# Patient Record
Sex: FEMALE | Race: BLACK OR AFRICAN AMERICAN | NOT HISPANIC OR LATINO | Employment: FULL TIME | URBAN - METROPOLITAN AREA
[De-identification: names, ages, dates, MRNs, and addresses within clinical notes are randomized per-mention and may not be internally consistent; named-entity substitution may affect disease eponyms.]

---

## 2018-08-07 ENCOUNTER — HOSPITAL ENCOUNTER (EMERGENCY)
Facility: CLINIC | Age: 28
Discharge: HOME | End: 2018-08-07
Attending: EMERGENCY MEDICINE | Admitting: EMERGENCY MEDICINE
Payer: MEDICAID

## 2018-08-07 ENCOUNTER — APPOINTMENT (OUTPATIENT)
Dept: CT IMAGING | Facility: CLINIC | Age: 28
End: 2018-08-07
Attending: EMERGENCY MEDICINE
Payer: MEDICAID

## 2018-08-07 VITALS
HEART RATE: 92 BPM | HEIGHT: 66 IN | WEIGHT: 183 LBS | RESPIRATION RATE: 18 BRPM | SYSTOLIC BLOOD PRESSURE: 130 MMHG | OXYGEN SATURATION: 100 % | BODY MASS INDEX: 29.41 KG/M2 | TEMPERATURE: 99.1 F | DIASTOLIC BLOOD PRESSURE: 80 MMHG

## 2018-08-07 DIAGNOSIS — R52 BODY ACHES: ICD-10-CM

## 2018-08-07 DIAGNOSIS — R07.81 PLEURITIC CHEST PAIN: ICD-10-CM

## 2018-08-07 DIAGNOSIS — G47.00 INSOMNIA, UNSPECIFIED TYPE: ICD-10-CM

## 2018-08-07 LAB
ANION GAP SERPL CALCULATED.3IONS-SCNC: 7 MMOL/L (ref 3–14)
ANION GAP SERPL CALCULATED.3IONS-SCNC: NORMAL MMOL/L (ref 6–17)
BASOPHILS # BLD AUTO: 0 10E9/L (ref 0–0.2)
BASOPHILS NFR BLD AUTO: 0.3 %
BUN SERPL-MCNC: 8 MG/DL (ref 7–30)
BUN SERPL-MCNC: NORMAL MG/DL (ref 7–30)
CALCIUM SERPL-MCNC: 7.9 MG/DL (ref 8.5–10.1)
CALCIUM SERPL-MCNC: NORMAL MG/DL (ref 8.5–10.1)
CHLORIDE SERPL-SCNC: 103 MMOL/L (ref 94–109)
CHLORIDE SERPL-SCNC: NORMAL MMOL/L (ref 94–109)
CO2 SERPL-SCNC: 27 MMOL/L (ref 20–32)
CO2 SERPL-SCNC: NORMAL MMOL/L (ref 20–32)
CREAT SERPL-MCNC: 0.92 MG/DL (ref 0.52–1.04)
CREAT SERPL-MCNC: NORMAL MG/DL (ref 0.52–1.04)
D DIMER PPP FEU-MCNC: 1.7 UG/ML FEU (ref 0–0.5)
DIFFERENTIAL METHOD BLD: ABNORMAL
EOSINOPHIL # BLD AUTO: 0.1 10E9/L (ref 0–0.7)
EOSINOPHIL NFR BLD AUTO: 3.2 %
ERYTHROCYTE [DISTWIDTH] IN BLOOD BY AUTOMATED COUNT: 12.1 % (ref 10–15)
GFR SERPL CREATININE-BSD FRML MDRD: 73 ML/MIN/1.7M2
GFR SERPL CREATININE-BSD FRML MDRD: NORMAL ML/MIN/1.7M2
GLUCOSE SERPL-MCNC: 89 MG/DL (ref 70–99)
GLUCOSE SERPL-MCNC: NORMAL MG/DL (ref 70–99)
HCG SERPL QL: NEGATIVE
HCT VFR BLD AUTO: 38 % (ref 35–47)
HETEROPH AB SER QL: NEGATIVE
HGB BLD-MCNC: 12.9 G/DL (ref 11.7–15.7)
IMM GRANULOCYTES # BLD: 0 10E9/L (ref 0–0.4)
IMM GRANULOCYTES NFR BLD: 0.3 %
INTERPRETATION ECG - MUSE: NORMAL
LYMPHOCYTES # BLD AUTO: 1.1 10E9/L (ref 0.8–5.3)
LYMPHOCYTES NFR BLD AUTO: 30.7 %
MCH RBC QN AUTO: 30.7 PG (ref 26.5–33)
MCHC RBC AUTO-ENTMCNC: 33.9 G/DL (ref 31.5–36.5)
MCV RBC AUTO: 91 FL (ref 78–100)
MONOCYTES # BLD AUTO: 0.3 10E9/L (ref 0–1.3)
MONOCYTES NFR BLD AUTO: 8.7 %
NEUTROPHILS # BLD AUTO: 2 10E9/L (ref 1.6–8.3)
NEUTROPHILS NFR BLD AUTO: 56.8 %
PLATELET # BLD AUTO: 135 10E9/L (ref 150–450)
POTASSIUM SERPL-SCNC: 3.2 MMOL/L (ref 3.4–5.3)
POTASSIUM SERPL-SCNC: NORMAL MMOL/L (ref 3.4–5.3)
RBC # BLD AUTO: 4.2 10E12/L (ref 3.8–5.2)
SODIUM SERPL-SCNC: 137 MMOL/L (ref 133–144)
SODIUM SERPL-SCNC: NORMAL MMOL/L (ref 133–144)
TROPONIN I SERPL-MCNC: <0.015 UG/L (ref 0–0.04)
TROPONIN I SERPL-MCNC: NORMAL UG/L (ref 0–0.04)
TSH SERPL DL<=0.005 MIU/L-ACNC: 1.78 MU/L (ref 0.4–4)
TSH SERPL DL<=0.005 MIU/L-ACNC: NORMAL MU/L (ref 0.4–4)
WBC # BLD AUTO: 3.5 10E9/L (ref 4–11)

## 2018-08-07 PROCEDURE — 99285 EMERGENCY DEPT VISIT HI MDM: CPT | Mod: 25

## 2018-08-07 PROCEDURE — 85379 FIBRIN DEGRADATION QUANT: CPT | Performed by: EMERGENCY MEDICINE

## 2018-08-07 PROCEDURE — 84443 ASSAY THYROID STIM HORMONE: CPT | Performed by: EMERGENCY MEDICINE

## 2018-08-07 PROCEDURE — 25000125 ZZHC RX 250: Performed by: EMERGENCY MEDICINE

## 2018-08-07 PROCEDURE — 84703 CHORIONIC GONADOTROPIN ASSAY: CPT | Performed by: EMERGENCY MEDICINE

## 2018-08-07 PROCEDURE — 80048 BASIC METABOLIC PNL TOTAL CA: CPT | Performed by: EMERGENCY MEDICINE

## 2018-08-07 PROCEDURE — 84484 ASSAY OF TROPONIN QUANT: CPT | Performed by: EMERGENCY MEDICINE

## 2018-08-07 PROCEDURE — 86308 HETEROPHILE ANTIBODY SCREEN: CPT | Performed by: EMERGENCY MEDICINE

## 2018-08-07 PROCEDURE — 71260 CT THORAX DX C+: CPT

## 2018-08-07 PROCEDURE — 93005 ELECTROCARDIOGRAM TRACING: CPT

## 2018-08-07 PROCEDURE — 85025 COMPLETE CBC W/AUTO DIFF WBC: CPT | Performed by: EMERGENCY MEDICINE

## 2018-08-07 PROCEDURE — 25000128 H RX IP 250 OP 636: Performed by: EMERGENCY MEDICINE

## 2018-08-07 RX ORDER — TRAZODONE HYDROCHLORIDE 50 MG/1
50 TABLET, FILM COATED ORAL
Qty: 7 TABLET | Refills: 0 | Status: SHIPPED | OUTPATIENT
Start: 2018-08-07 | End: 2020-06-29

## 2018-08-07 RX ORDER — IOPAMIDOL 755 MG/ML
69 INJECTION, SOLUTION INTRAVASCULAR ONCE
Status: COMPLETED | OUTPATIENT
Start: 2018-08-07 | End: 2018-08-07

## 2018-08-07 RX ADMIN — IOPAMIDOL 69 ML: 755 INJECTION, SOLUTION INTRAVENOUS at 03:34

## 2018-08-07 RX ADMIN — SODIUM CHLORIDE 93 ML: 900 INJECTION INTRAVENOUS at 03:35

## 2018-08-07 ASSESSMENT — ENCOUNTER SYMPTOMS
CHILLS: 1
DIARRHEA: 0
CONSTIPATION: 1
MYALGIAS: 1
FEVER: 1
NAUSEA: 0
FATIGUE: 1
EYE ITCHING: 0
PALPITATIONS: 1
SORE THROAT: 0
RHINORRHEA: 0
SHORTNESS OF BREATH: 0
VOMITING: 0

## 2018-08-07 NOTE — ED AVS SNAPSHOT
Emergency Department    [address] [place] [address]    Phone:  [phone]    Fax:  [phone]                                       Zehra Brunner   MRN: [id number]    Department:   Emergency Department   Date of Visit:  8/7/2018           After Visit Summary Signature Page     I have received my discharge instructions, and my questions have been answered. I have discussed any challenges I see with this plan with the nurse or doctor.    ..........................................................................................................................................  Patient/Patient Representative Signature      ..........................................................................................................................................  Patient Representative Print Name and Relationship to Patient    ..................................................               ................................................  Date                                            Time    ..........................................................................................................................................  Reviewed by Signature/Title    ...................................................              ..............................................  Date                                                            Time

## 2018-08-07 NOTE — DISCHARGE INSTRUCTIONS
Continue to use naproxen as needed for pain.    For sleep, the following are available over the counter:  - Melatonin: use a minimum of 3mg at least 30 minutes before trying to fall asleep  - Benadryl (diphenhydramine): 50mg (2 tablets) as needed for sleep    Discharge Instructions  Chest Pain    You have been seen today for chest pain or discomfort.  At this time, your provider has found no signs that your chest pain is due to a serious or life-threatening condition, (or you have declined more testing and/or admission to the hospital). However, sometimes there is a serious problem that does not show up right away. Your evaluation today may not be complete and you may need further testing and evaluation.     Generally, every Emergency Department visit should have a follow-up clinic visit with either a primary or a specialty clinic/provider. Please follow-up as instructed by your emergency provider today.  Return to the Emergency Department if:    Your chest pain changes, gets worse, starts to happen more often, or comes with less activity.    You are newly short of breath.    You get very weak or tired.    You pass out or faint.    You have any new symptoms, like fever, cough, numb legs, or you cough up blood.    You have anything else that worries you.    Until you follow-up with your regular provider, please do the following:    Take one aspirin daily unless you have an allergy or are told not to by your provider.    If a stress test appointment has been made, go to the appointment.    If you have questions, contact your regular provider.    Follow-up with your regular provider/clinic as directed; this is very important.    If you were given a prescription for medicine here today, be sure to read all of the information (including the package insert) that comes with your prescription.  This will include important information about the medicine, its side effects, and any warnings that you need to know about.  The  pharmacist who fills the prescription can provide more information and answer questions you may have about the medicine.  If you have questions or concerns that the pharmacist cannot address, please call or return to the Emergency Department.       Remember that you can always come back to the Emergency Department if you are not able to see your regular provider in the amount of time listed above, if you get any new symptoms, or if there is anything that worries you.

## 2018-08-07 NOTE — ED PROVIDER NOTES
History     Chief Complaint:  Chest Pain and myalgias    HPI   Zehra Brunner is a 27 year old female who presents to the emergency department today for evaluation of chest pain and generalized body aches. The patient reports she has been experiencing myalgias with associated fatigue, congestion, headache, and subjective fevers and chills for the past four days. She notes that she has been having difficulty breathing deeply with pain in her left side. She has been taking muscle relaxers with partial relief (later states it was naproxen). Last night, she notes heart was beating heavily with difficulty sleeping.  Trouble sleeping has been present a few days.  Additionally, she has been experiencing three week of constipations. She was concerned about these symptoms prompting her visit to the ED. She denies shortness of breath, rhinorrhea, sore throat, new eye itching, sneezing, vomiting, diarrhea, and nausea. Of note, the patient did present with her son who has being seen in the ED as well. She is unsure about the chance of pregnancy, but is on injection birth control.    Allergies:  No Known Drug Allergies    Medications:    Lamictal    Past Medical History:    Depression    Past Surgical History:    History reviewed. No pertinent past surgical history.    Family History:    History reviewed. No pertinent family history.     Social History:  The patient was accompanied to the ED by children.  Smoking Status: Current Every Day Smoker  Smokeless Tobacco: Never  Alcohol Use: Yes  Marital Status:  Single     Review of Systems   Constitutional: Positive for chills, fatigue and fever.   HENT: Positive for congestion. Negative for rhinorrhea, sneezing and sore throat.    Eyes: Negative for itching.   Respiratory: Negative for shortness of breath.    Cardiovascular: Positive for chest pain and palpitations.   Gastrointestinal: Positive for constipation. Negative for diarrhea, nausea and vomiting.   Musculoskeletal:  "Positive for myalgias.   All other systems reviewed and are negative.    Physical Exam     Patient Vitals for the past 24 hrs:   BP Temp Temp src Pulse Resp SpO2 Height Weight   08/07/18 0426 130/80 - - - - - - -   08/07/18 0110 133/84 99.1  F (37.3  C) Oral 92 18 100 % 1.676 m (5' 6\") 83 kg (183 lb)         Physical Exam  Eyes:  Sclera white; Pupils are equal and round  ENT:    External ears and nares normal, prominent thyroid region of neck but symmetric and non-tender    Oropharynx normal  CV:  Rate as above with regular rhythm     Symmetric radial pulses    No BLE edema  Resp:  Breath sounds clear and equal bilaterally  GI:  Abdomen is soft, non-tender  MS:  Moves all extremities  Skin:  Warm and dry  Neuro:  Speech is normal and fluent. Alert.          Emergency Department Course   ECG:  Indication: chest pain  Completed at 0102.  Read at 0109.   Normal sinus rhythm  Normal ECG   Rate 91 bpm. IN interval 144. QRS duration 84. QT/QTc 368/452. P-R-T axes 70  38  33.    Imaging:  Radiology findings were communicated with the patient who voiced understanding of the findings.  CT Chest Pulmonary Embolism w Contrast  IMPRESSION: There is no pulmonary embolus, aortic aneurysm or  dissection. No acute abnormality.  Report per radiology     Laboratory:  Laboratory findings were communicated with the patient who voiced understanding of the findings.  CBC: WBC 3.5 (L),  (L) o/w WNL. (HGB 12.9)   BMP: potassium 3.2 (L) o/w WNL (Creatinine 0.92)  Troponin (Collected 0134): <0.015  TSH with free T4 reflex: 1.78  D Dimer (Collected 0111): 1.7 (H)  Mononucleosis: Negative  HCG Qualitative Blood: Negative    Emergency Department Course:  Nursing notes and vitals reviewed.  IV was inserted and blood was drawn for laboratory testing, results above.  The patient was sent for a CT Chest Pulmonary Embolism w Contrast while in the emergency department, results above.   0050: I performed an exam of the patient as documented " above.   0423: Patient rechecked and updated.   Findings and plan explained to the Patient. Patient discharged home with instructions regarding supportive care, medications, and reasons to return. The importance of close follow-up was reviewed. The patient was prescribed Trazodone.  I personally reviewed the laboratory, imaging, and EKG results with the Patient and answered all related questions prior to discharge.    Impression & Plan    Medical Decision Making:  Zehra Brunner is a 27 year old female who is here for evaluation of pleuritic chest pain, fatigue, and body aches. Work up is not consistent with mono as an etiology of her symptoms. She is on birth control and d-dimer is elevated concerning for PE. CT was obtained showing no acute etiology for her symptoms. EKG showed no dysrhythmia or ischemia. Blood work was negative for anemia, significant electrolyte abnormalities, thyroid abnormalities, or cardiac injury. At this point, I recommended the use of NSAIDs and over the counter sleep agents. If these do not help, then a short prescription for trazodone was prescribed.     Diagnosis:    ICD-10-CM    1. Pleuritic chest pain R07.81    2. Body aches R52    3. Insomnia, unspecified type G47.00        Disposition:  discharged to home    Discharge Medications:  New Prescriptions    TRAZODONE (DESYREL) 50 MG TABLET    Take 1 tablet (50 mg) by mouth nightly as needed for sleep         Scribe Disclosure:  Perry MCCLURE, am serving as a scribe at 1:02 AM on 8/7/2018 to document services personally performed by Stacy Pena MD based on my observations and the provider's statements to me.     8/7/2018    EMERGENCY DEPARTMENT       Stacy Pena MD  08/07/18 0646

## 2018-08-07 NOTE — ED AVS SNAPSHOT
Emergency Department    [address] [place] [address]    Phone:  [phone]    Fax:  [phone]                                       Zehra Brunner   MRN: [id number]    Department:   Emergency Department   Date of Visit:  8/7/2018           Patient Information     Date Of Birth          1990        Your diagnoses for this visit were:     Pleuritic chest pain     Body aches     Insomnia, unspecified type        You were seen by Stacy Pena MD.      Follow-up Information     Follow up with In clinic.    Why:  As needed        Discharge Instructions       Continue to use naproxen as needed for pain.    For sleep, the following are available over the counter:  - Melatonin: use a minimum of 3mg at least 30 minutes before trying to fall asleep  - Benadryl (diphenhydramine): 50mg (2 tablets) as needed for sleep    Discharge Instructions  Chest Pain    You have been seen today for chest pain or discomfort.  At this time, your provider has found no signs that your chest pain is due to a serious or life-threatening condition, (or you have declined more testing and/or admission to the hospital). However, sometimes there is a serious problem that does not show up right away. Your evaluation today may not be complete and you may need further testing and evaluation.     Generally, every Emergency Department visit should have a follow-up clinic visit with either a primary or a specialty clinic/provider. Please follow-up as instructed by your emergency provider today.  Return to the Emergency Department if:    Your chest pain changes, gets worse, starts to happen more often, or comes with less activity.    You are newly short of breath.    You get very weak or tired.    You pass out or faint.    You have any new symptoms, like fever, cough, numb legs, or you cough up blood.    You have anything else that worries you.    Until you follow-up with your regular provider, please do the  following:    Take one aspirin daily unless you have an allergy or are told not to by your provider.    If a stress test appointment has been made, go to the appointment.    If you have questions, contact your regular provider.    Follow-up with your regular provider/clinic as directed; this is very important.    If you were given a prescription for medicine here today, be sure to read all of the information (including the package insert) that comes with your prescription.  This will include important information about the medicine, its side effects, and any warnings that you need to know about.  The pharmacist who fills the prescription can provide more information and answer questions you may have about the medicine.  If you have questions or concerns that the pharmacist cannot address, please call or return to the Emergency Department.       Remember that you can always come back to the Emergency Department if you are not able to see your regular provider in the amount of time listed above, if you get any new symptoms, or if there is anything that worries you.      24 Hour Appointment Hotline       To make an appointment at any [facility], call [phone] ([phone]). If you don't have a family doctor or clinic, we will help you find one. [facility] clinics are conveniently located to serve the needs of you and your family.             Review of your medicines      START taking        Dose / Directions Last dose taken    traZODone 50 MG tablet   Commonly known as:  DESYREL   Dose:  50 mg   Quantity:  7 tablet        Take 1 tablet (50 mg) by mouth nightly as needed for sleep   Refills:  0          Our records show that you are taking the medicines listed below. If these are incorrect, please call your family doctor or clinic.        Dose / Directions Last dose taken    metroNIDAZOLE 500 MG tablet   Commonly known as:  FLAGYL   Dose:  500 mg   Quantity:  21 tablet        Take 1 tablet by mouth 3 times  daily.   Refills:  0        NO ACTIVE MEDICATIONS        Refills:  0                Prescriptions were sent or printed at these locations (1 Prescription)                   Other Prescriptions                Printed at Department/Unit printer (1 of 1)         traZODone (DESYREL) 50 MG tablet                Procedures and tests performed during your visit     Procedure/Test Number of Times Performed    Basic metabolic panel 2    CBC with platelets differential 1    CT Chest Pulmonary Embolism w Contrast 1    D dimer quantitative 1    EKG 12-lead, tracing only 1    HCG QUALitative pregnancy (blood) 1    Mononucleosis screen 1    Peripheral IV catheter 1    TSH with free T4 reflex 2    Troponin I 2      Orders Needing Specimen Collection     None      Pending Results     Date and Time Order Name Status Description    8/7/2018 0226 CT Chest Pulmonary Embolism w Contrast Preliminary             Pending Culture Results     No orders found from 8/5/2018 to 8/8/2018.            Pending Results Instructions     If you had any lab results that were not finalized at the time of your Discharge, you can call the ED Lab Result RN at [phone]. You will be contacted by this team for any positive Lab results or changes in treatment. The nurses are available 7 days a week from 10A to 6:30P.  You can leave a message 24 hours per day and they will return your call.        Test Results From Your Hospital Stay        8/7/2018  1:24 AM      Component Results     Component Value Ref Range & Units Status    WBC 3.5 (L) 4.0 - 11.0 10e9/L Final    RBC Count 4.20 3.8 - 5.2 10e12/L Final    Hemoglobin 12.9 11.7 - 15.7 g/dL Final    Hematocrit 38.0 35.0 - 47.0 % Final    MCV 91 78 - 100 fl Final    MCH 30.7 26.5 - 33.0 pg Final    MCHC 33.9 31.5 - 36.5 g/dL Final    RDW 12.1 10.0 - 15.0 % Final    Platelet Count 135 (L) 150 - 450 10e9/L Final    Diff Method Automated Method  Final    % Neutrophils 56.8 % Final    % Lymphocytes 30.7 % Final     % Monocytes 8.7 % Final    % Eosinophils 3.2 % Final    % Basophils 0.3 % Final    % Immature Granulocytes 0.3 % Final    Absolute Neutrophil 2.0 1.6 - 8.3 10e9/L Final    Absolute Lymphocytes 1.1 0.8 - 5.3 10e9/L Final    Absolute Monocytes 0.3 0.0 - 1.3 10e9/L Final    Absolute Eosinophils 0.1 0.0 - 0.7 10e9/L Final    Absolute Basophils 0.0 0.0 - 0.2 10e9/L Final    Abs Immature Granulocytes 0.0 0 - 0.4 10e9/L Final         8/7/2018  1:28 AM      Component Results     Component Value Ref Range & Units Status    Sodium Canceled, Test credited 133 - 144 mmol/L Final    Unsatisfactory specimen - hemolyzed    Potassium Canceled, Test credited 3.4 - 5.3 mmol/L Final    Unsatisfactory specimen - hemolyzed    Chloride Canceled, Test credited 94 - 109 mmol/L Final    Unsatisfactory specimen - hemolyzed    Carbon Dioxide Canceled, Test credited 20 - 32 mmol/L Final    Unsatisfactory specimen - hemolyzed    Anion Gap Canceled, Test credited 6 - 17 mmol/L Final    Unsatisfactory specimen - hemolyzed    Glucose Canceled, Test credited 70 - 99 mg/dL Final    Unsatisfactory specimen - hemolyzed    Urea Nitrogen Canceled, Test credited 7 - 30 mg/dL Final    Unsatisfactory specimen - hemolyzed    Creatinine Canceled, Test credited 0.52 - 1.04 mg/dL Final    Unsatisfactory specimen - hemolyzed    GFR Estimate Canceled, Test credited >60 mL/min/1.7m2 Final    Unsatisfactory specimen - hemolyzed    GFR Estimate If Black Canceled, Test credited >60 mL/min/1.7m2 Final    Unsatisfactory specimen - hemolyzed    Calcium Canceled, Test credited 8.5 - 10.1 mg/dL Final    Unsatisfactory specimen - hemolyzed         8/7/2018  1:28 AM      Component Results     Component Value Ref Range & Units Status    TSH Canceled, Test credited 0.40 - 4.00 mU/L Final    Unsatisfactory specimen - hemolyzed         8/7/2018  1:28 AM      Component Results     Component Value Ref Range & Units Status    Troponin I ES Canceled, Test credited 0.000 - 0.045  ug/L Final    Unsatisfactory specimen - hemolyzed         8/7/2018  1:50 AM      Component Results     Component Value Ref Range & Units Status    D Dimer 1.7 (H) 0.0 - 0.50 ug/ml FEU Final    This D-dimer assay is intended for use in conjunction with a clinical pretest   probability assessment model to exclude pulmonary embolism (PE) and deep   venous thrombosis (DVT) in outpatients suspected of PE or DVT. The cut-off   value is 0.5 ug/mL FEU.           8/7/2018  1:40 AM      Component Results     Component Value Ref Range & Units Status    Mononucleosis Screen Negative NEG^Negative Final         8/7/2018  2:01 AM      Component Results     Component Value Ref Range & Units Status    Sodium 137 133 - 144 mmol/L Final    Potassium 3.2 (L) 3.4 - 5.3 mmol/L Final    Chloride 103 94 - 109 mmol/L Final    Carbon Dioxide 27 20 - 32 mmol/L Final    Anion Gap 7 3 - 14 mmol/L Final    Glucose 89 70 - 99 mg/dL Final    Urea Nitrogen 8 7 - 30 mg/dL Final    Creatinine 0.92 0.52 - 1.04 mg/dL Final    GFR Estimate 73 >60 mL/min/1.7m2 Final    Non  GFR Calc    GFR Estimate If Black 88 >60 mL/min/1.7m2 Final    African American GFR Calc    Calcium 7.9 (L) 8.5 - 10.1 mg/dL Final         8/7/2018  2:01 AM      Component Results     Component Value Ref Range & Units Status    Troponin I ES <0.015 0.000 - 0.045 ug/L Final    The 99th percentile for upper reference range is 0.045 ug/L.  Troponin values   in the range of 0.045 - 0.120 ug/L may be associated with risks of adverse   clinical events.           8/7/2018  2:06 AM      Component Results     Component Value Ref Range & Units Status    TSH 1.78 0.40 - 4.00 mU/L Final         8/7/2018  3:48 AM      Narrative     CT CHEST PULMONARY EMBOLISM W CONTRAST  8/7/2018 3:38 AM    HISTORY: Dyspnea.     TECHNIQUE: Scans obtained from the apices through the diaphragm with  IV contrast. 69 mL Isovue-370 injected. Radiation dose for this scan  was reduced using automated  exposure control, adjustment of the mA  and/or kV according to patient size, or iterative reconstruction  technique.    COMPARISON: None.    FINDINGS: Evaluation of the pulmonary arterial system shows no  evidence of embolus. There is no aortic aneurysm or dissection. The  heart size is normal. There is no mediastinal, hilar or axillary lymph  node enlargement. A few borderline-enlarged lymph nodes in the axillae  bilaterally. The lungs are clear. No pneumothorax or pleural effusion.  Images through the upper abdomen show no acute abnormalities.        Impression     IMPRESSION: There is no pulmonary embolus, aortic aneurysm or  dissection. No acute abnormality.         8/7/2018  2:57 AM      Component Results     Component Value Ref Range & Units Status    HCG Qualitative Serum Negative NEG^Negative Final    This test is for screening purposes.  Results should be interpreted along with   the clinical picture.  Confirmation testing is available if warranted by   ordering [id number], HCG Quantitative Pregnancy.                  Clinical Quality Measure: Blood Pressure Screening     Your blood pressure was checked while you were in the emergency department today. The last reading we obtained was  BP: 130/80 . Please read the guidelines below about what these numbers mean and what you should do about them.  If your systolic blood pressure (the top number) is less than 120 and your diastolic blood pressure (the bottom number) is less than 80, then your blood pressure is normal. There is nothing more that you need to do about it.  If your systolic blood pressure (the top number) is 120-139 or your diastolic blood pressure (the bottom number) is 80-89, your blood pressure may be higher than it should be. You should have your blood pressure rechecked within a year by a primary care provider.  If your systolic blood pressure (the top number) is 140 or greater or your diastolic blood pressure (the bottom number) is 90 or  "greater, you may have high blood pressure. High blood pressure is treatable, but if left untreated over time it can put you at risk for heart attack, stroke, or kidney failure. You should have your blood pressure rechecked by a primary care provider within the next 4 weeks.  If your provider in the emergency department today gave you specific instructions to follow-up with your doctor or provider even sooner than that, you should follow that instruction and not wait for up to 4 weeks for your follow-up visit.        Thank you for choosing [facility]       Thank you for choosing [facility] for your care. Our goal is always to provide you with excellent care. Hearing back from our patients is one way we can continue to improve our services. Please take a few minutes to complete the written survey that you may receive in the mail after you visit with us. Thank you!        [facility] [employer] Information     [employer] lets you send messages to your doctor, view your test results, renew your prescriptions, schedule appointments and more. To sign up, go to [email or web address] . Click on \"Log in\" on the left side of the screen, which will take you to the Welcome page. Then click on \"Sign up Now\" on the right side of the page.     You will be asked to enter the access code listed below, as well as some personal information. Please follow the directions to create your username and password.     Your access code is: [email or web address]  Expires: 2018  4:35 AM     Your access code will  in 90 days. If you need help or a new code, please call your [facility] clinic or [phone].        Care EveryWhere ID     This is your Care EveryWhere ID. This could be used by other organizations to access your [facility] medical records  [id number]        Equal Access to Services     FIONA DE LEON : Prema Sam, lior higuera, munira gamble. So [facility] [phone].    ATENCIÓN: " Si habla emma, tiene a mendez disposición servicios gratuitos de asistencia lingüística. Llame al [phone].    We comply with applicable federal civil rights laws and [place] laws. We do not discriminate on the basis of race, color, national origin, age, disability, sex, sexual orientation, or gender identity.            After Visit Summary       This is your record. Keep this with you and show to your community pharmacist(s) and doctor(s) at your next visit.

## 2018-08-08 ENCOUNTER — APPOINTMENT (OUTPATIENT)
Dept: CT IMAGING | Facility: CLINIC | Age: 28
End: 2018-08-08
Attending: EMERGENCY MEDICINE
Payer: MEDICAID

## 2018-08-08 ENCOUNTER — HOSPITAL ENCOUNTER (EMERGENCY)
Facility: CLINIC | Age: 28
Discharge: HOME | End: 2018-08-09
Attending: EMERGENCY MEDICINE | Admitting: EMERGENCY MEDICINE
Payer: MEDICAID

## 2018-08-08 DIAGNOSIS — R74.8 ELEVATED LIVER ENZYMES: ICD-10-CM

## 2018-08-08 DIAGNOSIS — D69.6 THROMBOCYTOPENIA (H): ICD-10-CM

## 2018-08-08 DIAGNOSIS — J18.9 PNEUMONIA OF LEFT LOWER LOBE DUE TO INFECTIOUS ORGANISM: ICD-10-CM

## 2018-08-08 DIAGNOSIS — B34.9 VIRAL SYNDROME: ICD-10-CM

## 2018-08-08 DIAGNOSIS — D72.819 LEUKOPENIA, UNSPECIFIED TYPE: ICD-10-CM

## 2018-08-08 DIAGNOSIS — R50.9 ACUTE FEBRILE ILLNESS: ICD-10-CM

## 2018-08-08 LAB
ALBUMIN SERPL-MCNC: 3.5 G/DL (ref 3.4–5)
ALBUMIN UR-MCNC: 30 MG/DL
ALP SERPL-CCNC: 69 U/L (ref 40–150)
ALT SERPL W P-5'-P-CCNC: 247 U/L (ref 0–50)
ANION GAP SERPL CALCULATED.3IONS-SCNC: 7 MMOL/L (ref 3–14)
APPEARANCE UR: ABNORMAL
AST SERPL W P-5'-P-CCNC: 209 U/L (ref 0–45)
BASOPHILS # BLD AUTO: 0 10E9/L (ref 0–0.2)
BASOPHILS NFR BLD AUTO: 0.4 %
BILIRUB SERPL-MCNC: 0.3 MG/DL (ref 0.2–1.3)
BILIRUB UR QL STRIP: NEGATIVE
BUN SERPL-MCNC: 9 MG/DL (ref 7–30)
CALCIUM SERPL-MCNC: 8.2 MG/DL (ref 8.5–10.1)
CHLORIDE SERPL-SCNC: 102 MMOL/L (ref 94–109)
CO2 SERPL-SCNC: 27 MMOL/L (ref 20–32)
COLOR UR AUTO: YELLOW
CREAT SERPL-MCNC: 0.77 MG/DL (ref 0.52–1.04)
DIFFERENTIAL METHOD BLD: ABNORMAL
EOSINOPHIL # BLD AUTO: 0.1 10E9/L (ref 0–0.7)
EOSINOPHIL NFR BLD AUTO: 3.6 %
ERYTHROCYTE [DISTWIDTH] IN BLOOD BY AUTOMATED COUNT: 11.9 % (ref 10–15)
GFR SERPL CREATININE-BSD FRML MDRD: 90 ML/MIN/1.7M2
GLUCOSE SERPL-MCNC: 87 MG/DL (ref 70–99)
GLUCOSE UR STRIP-MCNC: NEGATIVE MG/DL
HCT VFR BLD AUTO: 38.5 % (ref 35–47)
HGB BLD-MCNC: 12.9 G/DL (ref 11.7–15.7)
HGB UR QL STRIP: NEGATIVE
IMM GRANULOCYTES # BLD: 0 10E9/L (ref 0–0.4)
IMM GRANULOCYTES NFR BLD: 0 %
KETONES UR STRIP-MCNC: NEGATIVE MG/DL
LEUKOCYTE ESTERASE UR QL STRIP: ABNORMAL
LIPASE SERPL-CCNC: 65 U/L (ref 73–393)
LYMPHOCYTES # BLD AUTO: 0.6 10E9/L (ref 0.8–5.3)
LYMPHOCYTES NFR BLD AUTO: 26.2 %
MCH RBC QN AUTO: 31 PG (ref 26.5–33)
MCHC RBC AUTO-ENTMCNC: 33.5 G/DL (ref 31.5–36.5)
MCV RBC AUTO: 93 FL (ref 78–100)
MONOCYTES # BLD AUTO: 0.2 10E9/L (ref 0–1.3)
MONOCYTES NFR BLD AUTO: 8.9 %
MUCOUS THREADS #/AREA URNS LPF: PRESENT /LPF
NEUTROPHILS # BLD AUTO: 1.4 10E9/L (ref 1.6–8.3)
NEUTROPHILS NFR BLD AUTO: 60.9 %
NITRATE UR QL: NEGATIVE
NRBC # BLD AUTO: 0 10*3/UL
NRBC BLD AUTO-RTO: 0 /100
PH UR STRIP: 6 PH (ref 5–7)
PLATELET # BLD AUTO: 112 10E9/L (ref 150–450)
POTASSIUM SERPL-SCNC: 3.2 MMOL/L (ref 3.4–5.3)
PROT SERPL-MCNC: 8.2 G/DL (ref 6.8–8.8)
RBC # BLD AUTO: 4.16 10E12/L (ref 3.8–5.2)
RBC #/AREA URNS AUTO: 3 /HPF (ref 0–2)
SODIUM SERPL-SCNC: 136 MMOL/L (ref 133–144)
SOURCE: ABNORMAL
SP GR UR STRIP: 1.02 (ref 1–1.03)
SQUAMOUS #/AREA URNS AUTO: 6 /HPF (ref 0–1)
UROBILINOGEN UR STRIP-MCNC: 4 MG/DL (ref 0–2)
WBC # BLD AUTO: 2.3 10E9/L (ref 4–11)
WBC #/AREA URNS AUTO: 2 /HPF (ref 0–5)

## 2018-08-08 PROCEDURE — 96374 THER/PROPH/DIAG INJ IV PUSH: CPT

## 2018-08-08 PROCEDURE — 81001 URINALYSIS AUTO W/SCOPE: CPT | Performed by: EMERGENCY MEDICINE

## 2018-08-08 PROCEDURE — 83690 ASSAY OF LIPASE: CPT | Performed by: EMERGENCY MEDICINE

## 2018-08-08 PROCEDURE — 85025 COMPLETE CBC W/AUTO DIFF WBC: CPT | Performed by: EMERGENCY MEDICINE

## 2018-08-08 PROCEDURE — 93005 ELECTROCARDIOGRAM TRACING: CPT

## 2018-08-08 PROCEDURE — 96361 HYDRATE IV INFUSION ADD-ON: CPT

## 2018-08-08 PROCEDURE — 87389 HIV-1 AG W/HIV-1&-2 AB AG IA: CPT | Performed by: EMERGENCY MEDICINE

## 2018-08-08 PROCEDURE — 99285 EMERGENCY DEPT VISIT HI MDM: CPT | Mod: 25

## 2018-08-08 PROCEDURE — 25000132 ZZH RX MED GY IP 250 OP 250 PS 637: Performed by: EMERGENCY MEDICINE

## 2018-08-08 PROCEDURE — 80053 COMPREHEN METABOLIC PANEL: CPT | Performed by: EMERGENCY MEDICINE

## 2018-08-08 PROCEDURE — 25000128 H RX IP 250 OP 636: Performed by: EMERGENCY MEDICINE

## 2018-08-08 PROCEDURE — 74176 CT ABD & PELVIS W/O CONTRAST: CPT

## 2018-08-08 RX ORDER — DOXYCYCLINE HYCLATE 100 MG
100 TABLET ORAL ONCE
Status: COMPLETED | OUTPATIENT
Start: 2018-08-08 | End: 2018-08-09

## 2018-08-08 RX ORDER — KETOROLAC TROMETHAMINE 15 MG/ML
15 INJECTION, SOLUTION INTRAMUSCULAR; INTRAVENOUS ONCE
Status: COMPLETED | OUTPATIENT
Start: 2018-08-08 | End: 2018-08-08

## 2018-08-08 RX ORDER — ACETAMINOPHEN 500 MG
1000 TABLET ORAL ONCE
Status: COMPLETED | OUTPATIENT
Start: 2018-08-08 | End: 2018-08-08

## 2018-08-08 RX ORDER — DOXYCYCLINE 100 MG/1
100 CAPSULE ORAL 2 TIMES DAILY
Qty: 14 CAPSULE | Refills: 0 | Status: SHIPPED | OUTPATIENT
Start: 2018-08-08 | End: 2018-08-15

## 2018-08-08 RX ADMIN — SODIUM CHLORIDE, POTASSIUM CHLORIDE, SODIUM LACTATE AND CALCIUM CHLORIDE 1000 ML: 600; 310; 30; 20 INJECTION, SOLUTION INTRAVENOUS at 22:21

## 2018-08-08 RX ADMIN — KETOROLAC TROMETHAMINE 15 MG: 15 INJECTION, SOLUTION INTRAMUSCULAR; INTRAVENOUS at 22:21

## 2018-08-08 RX ADMIN — ACETAMINOPHEN 1000 MG: 500 TABLET, FILM COATED ORAL at 22:21

## 2018-08-08 NOTE — ED AVS SNAPSHOT
[facility] Emergency Department    [address] [place] [address]    Phone:  [phone]    Fax:  [phone]                                       Zehra Brunner   MRN: [id number]    Department:  [facility] Emergency Department   Date of Visit:  8/8/2018           Patient Information     Date Of Birth          1990        Your diagnoses for this visit were:     Acute febrile illness     Viral syndrome     Pneumonia of left lower lobe due to infectious organism (H) POSSIBLE    Elevated liver enzymes     Leukopenia, unspecified type     Thrombocytopenia (H)        You were seen by Jesse Ortega MD.        Discharge Instructions       Please make an appointment to follow up with your primary care provider in 7-10 ddays for recheck and repeat blood tests (CBC and hepatic panel)     Return to ER immediately if you develop: worsening symptoms, Fever > 101, persistent nausea or vomiting OR you have any other concerns about your health.    Discharge Instructions  Fever    You have been seen today for a fever. Fever is a normal body reaction to illness or inflammation. Fever is a sign that your body is doing what it should to fight something off. Fever is not dangerous, but it can make you feel miserable, and you will probably feel better if you get your fever to go down. Most infections are caused by a virus, and antibiotics will not help; your provider will tell you whether antibiotics are needed in your case. At this time your provider does not find that your fever is a sign of anything dangerous or life-threatening.  However, sometimes the signs of serious illness do not show up right away so additional care may be necessary.    Generally, every Emergency Department visit should have a follow-up clinic visit with either a primary or a specialty clinic/provider. Please follow-up as instructed by your emergency provider today.    What can I do to help  myself?    Fill any prescriptions the provider gave you and take them right away--especially antibiotics.    If you have a fever, get plenty of rest and drink lots of fluids, especially water.    What clothes or blankets you have on will not change your fever. Do what is comfortable for you.    Bathing or sponging in lukewarm water may help you feel better.    Tylenol  (acetaminophen), Motrin  (ibuprofen), or Advil  (ibuprofen) help bring fever down and may help you feel more comfortable. Be sure to read and follow the package directions, and ask your provider if you have questions.    Do not drink alcohol.    Return to the Emergency Department if:    Any of the symptoms you have get much worse.    You seem very sick, like being too weak to get up.    You have any new symptoms, especially serious things like abdominal (belly) pain or chest pain.    You are short of breath.    You have a severe headache.    You are vomiting (throwing up) so much you cannot keep fluids or medicines down.    You have confusion or seem unusually drowsy.    You have a seizure.    You have anything else that worries you.  If you were given a prescription for medicine here today, be sure to read all of the information (including the package insert) that comes with your prescription.  This will include important information about the medicine, its side effects, and any warnings that you need to know about.  The pharmacist who fills the prescription can provide more information and answer questions you may have about the medicine.  If you have questions or concerns that the pharmacist cannot address, please call or return to the Emergency Department.   Remember that you can always come back to the Emergency Department if you are not able to see your regular provider in the amount of time listed above, if you get any new symptoms, or if there is anything that worries you.          24 Hour Appointment Hotline       To make an appointment at  any [place] clinic, call [phone] ([phone]). If you don't have a family doctor or clinic, we will help you find one. [facility] are conveniently located to serve the needs of you and your family.             Review of your medicines      START taking        Dose / Directions Last dose taken    doxycycline 100 MG capsule   Commonly known as:  VIBRAMYCIN   Dose:  100 mg   Quantity:  14 capsule        Take 1 capsule (100 mg) by mouth 2 times daily for 7 days   Refills:  0          Our records show that you are taking the medicines listed below. If these are incorrect, please call your family doctor or clinic.        Dose / Directions Last dose taken    metroNIDAZOLE 500 MG tablet   Commonly known as:  FLAGYL   Dose:  500 mg   Quantity:  21 tablet        Take 1 tablet by mouth 3 times daily.   Refills:  0        NO ACTIVE MEDICATIONS        Refills:  0        traZODone 50 MG tablet   Commonly known as:  DESYREL   Dose:  50 mg   Quantity:  7 tablet        Take 1 tablet (50 mg) by mouth nightly as needed for sleep   Refills:  0                Prescriptions were sent or printed at these locations (1 Prescription)                   Other Prescriptions                Printed at Department/Unit printer (1 of 1)         doxycycline (VIBRAMYCIN) 100 MG capsule                Procedures and tests performed during your visit     CBC with platelets differential    CT Abdomen Pelvis w/o Contrast    Comprehensive metabolic panel    EKG 12 lead    HIV Antigen Antibody Combo    Lipase    UA with Microscopic      Orders Needing Specimen Collection     None      Pending Results     Date and Time Order Name Status Description    8/8/2018 2213 HIV Antigen Antibody Combo In process     8/8/2018 2207 EKG 12 lead Preliminary             Pending Culture Results     No orders found for last 3 day(s).            Pending Results Instructions     If you had any lab results that were not finalized at the time of your Discharge,  you can call the ED Lab Result RN at [phone]. You will be contacted by this team for any positive Lab results or changes in treatment. The nurses are available 7 days a week from 10A to 6:30P.  You can leave a message 24 hours per day and they will return your call.        Test Results From Your Hospital Stay        8/8/2018 10:22 PM      Component Results     Component Value Ref Range & Units Status    WBC 2.3 (L) 4.0 - 11.0 10e9/L Final    RBC Count 4.16 3.8 - 5.2 10e12/L Final    Hemoglobin 12.9 11.7 - 15.7 g/dL Final    Hematocrit 38.5 35.0 - 47.0 % Final    MCV 93 78 - 100 fl Final    MCH 31.0 26.5 - 33.0 pg Final    MCHC 33.5 31.5 - 36.5 g/dL Final    RDW 11.9 10.0 - 15.0 % Final    Platelet Count 112 (L) 150 - 450 10e9/L Final    Diff Method Automated Method  Final    % Neutrophils 60.9 % Final    % Lymphocytes 26.2 % Final    % Monocytes 8.9 % Final    % Eosinophils 3.6 % Final    % Basophils 0.4 % Final    % Immature Granulocytes 0.0 % Final    Nucleated RBCs 0 0 /100 Final    Absolute Neutrophil 1.4 (L) 1.6 - 8.3 10e9/L Final    Absolute Lymphocytes 0.6 (L) 0.8 - 5.3 10e9/L Final    Absolute Monocytes 0.2 0.0 - 1.3 10e9/L Final    Absolute Eosinophils 0.1 0.0 - 0.7 10e9/L Final    Absolute Basophils 0.0 0.0 - 0.2 10e9/L Final    Abs Immature Granulocytes 0.0 0 - 0.4 10e9/L Final    Absolute Nucleated RBC 0.0  Final         8/8/2018 10:41 PM      Component Results     Component Value Ref Range & Units Status    Sodium 136 133 - 144 mmol/L Final    Potassium 3.2 (L) 3.4 - 5.3 mmol/L Final    Chloride 102 94 - 109 mmol/L Final    Carbon Dioxide 27 20 - 32 mmol/L Final    Anion Gap 7 3 - 14 mmol/L Final    Glucose 87 70 - 99 mg/dL Final    Urea Nitrogen 9 7 - 30 mg/dL Final    Creatinine 0.77 0.52 - 1.04 mg/dL Final    GFR Estimate 90 >60 mL/min/1.7m2 Final    Non  GFR Calc    GFR Estimate If Black >90 >60 mL/min/1.7m2 Final    African American GFR Calc    Calcium 8.2 (L) 8.5 - 10.1 mg/dL  Final    Bilirubin Total 0.3 0.2 - 1.3 mg/dL Final    Albumin 3.5 3.4 - 5.0 g/dL Final    Protein Total 8.2 6.8 - 8.8 g/dL Final    Alkaline Phosphatase 69 40 - 150 U/L Final     (H) 0 - 50 U/L Final     (H) 0 - 45 U/L Final         8/8/2018 10:41 PM      Component Results     Component Value Ref Range & Units Status    Lipase 65 (L) 73 - 393 U/L Final         8/8/2018 10:31 PM      Component Results     Component Value Ref Range & Units Status    Color Urine Yellow  Final    Appearance Urine Slightly Cloudy  Final    Glucose Urine Negative NEG^Negative mg/dL Final    Bilirubin Urine Negative NEG^Negative Final    Ketones Urine Negative NEG^Negative mg/dL Final    Specific Gravity Urine 1.025 1.003 - 1.035 Final    Blood Urine Negative NEG^Negative Final    pH Urine 6.0 5.0 - 7.0 pH Final    Protein Albumin Urine 30 (A) NEG^Negative mg/dL Final    Urobilinogen mg/dL 4.0 (H) 0.0 - 2.0 mg/dL Final    Nitrite Urine Negative NEG^Negative Final    Leukocyte Esterase Urine Trace (A) NEG^Negative Final    Source Midstream Urine  Final    WBC Urine 2 0 - 5 /HPF Final    RBC Urine 3 (H) 0 - 2 /HPF Final    Squamous Epithelial /HPF Urine 6 (H) 0 - 1 /HPF Final    Mucous Urine Present (A) NEG^Negative /LPF Final         8/8/2018 11:13 PM      Narrative     CT ABDOMEN AND PELVIS WITHOUT CONTRAST 8/8/2018 10:37 PM    HISTORY: Left lower quadrant pain and fever.    TECHNIQUE: Helical axial scans from the dome of liver through the  pubic symphysis without contrast. Radiation dose for this scan was  reduced using automated exposure control, adjustment of the mA and/or  kV according to patient size, or iterative reconstruction technique.    COMPARISON: CT chest 8/7/2018.    FINDINGS: Interval development of a partially visualized linear  density left lateral lung base, likely platelike atelectasis. The  liver is normal. Borderline splenomegaly. The pancreas, bilateral  adrenal glands and kidneys bilaterally appear  normal without contrast.  The bowel and mesentery in the upper abdomen are unremarkable.    Scans through the pelvis show no acute abnormality. The appendix is  identified and appears normal. No free fluid.        Impression     IMPRESSION:  1. Small incompletely visualized linear density left lung base is  likely platelike atelectasis.  2. Borderline splenomegaly.  3. No acute abnormality in the abdomen or pelvis.    URI GEORGES MD         8/8/2018 11:50 PM                Clinical Quality Measure: Blood Pressure Screening     Your blood pressure was checked while you were in the emergency department today. The last reading we obtained was  BP: (!) 138/92 . Please read the guidelines below about what these numbers mean and what you should do about them.  If your systolic blood pressure (the top number) is less than 120 and your diastolic blood pressure (the bottom number) is less than 80, then your blood pressure is normal. There is nothing more that you need to do about it.  If your systolic blood pressure (the top number) is 120-139 or your diastolic blood pressure (the bottom number) is 80-89, your blood pressure may be higher than it should be. You should have your blood pressure rechecked within a year by a primary care provider.  If your systolic blood pressure (the top number) is 140 or greater or your diastolic blood pressure (the bottom number) is 90 or greater, you may have high blood pressure. High blood pressure is treatable, but if left untreated over time it can put you at risk for heart attack, stroke, or kidney failure. You should have your blood pressure rechecked by a primary care provider within the next 4 weeks.  If your provider in the emergency department today gave you specific instructions to follow-up with your doctor or provider even sooner than that, you should follow that instruction and not wait for up to 4 weeks for your follow-up visit.        Thank you for choosing [facility]      "  Thank you for choosing [facility] for your care. Our goal is always to provide you with excellent care. Hearing back from our patients is one way we can continue to improve our services. Please take a few minutes to complete the written survey that you may receive in the mail after you visit with us. Thank you!        [facility] Information     [employer] lets you send messages to your doctor, view your test results, renew your prescriptions, schedule appointments and more. To sign up, go to [email or web address] . Click on \"Log in\" on the left side of the screen, which will take you to the Welcome page. Then click on \"Sign up Now\" on the right side of the page.     You will be asked to enter the access code listed below, as well as some personal information. Please follow the directions to create your username and password.     Your access code is: [email or web address]  Expires: 2018  4:35 AM     Your access code will  in 90 days. If you need help or a new code, please call your [facility] clinic or [phone].        Care EveryWhere ID     This is your Care EveryWhere ID. This could be used by other organizations to access your [facility] medical records  [id number]        Equal Access to Services     FIONA DE LEON : Prema soriao Sozander, waaxda luqadaha, qaybta kaalmada adeegyada, munira alicia. So [facility] [phone].    ATENCIÓN: Si habla español, tiene a mendez disposición servicios gratuitos de asistencia lingüística. Llame al [phone].    We comply with applicable federal civil rights laws and [place] laws. We do not discriminate on the basis of race, color, national origin, age, disability, sex, sexual orientation, or gender identity.            After Visit Summary       This is your record. Keep this with you and show to your community pharmacist(s) and doctor(s) at your next visit.                  "

## 2018-08-08 NOTE — ED AVS SNAPSHOT
[facility] Emergency Department    [address]    [facility] [address]    Phone:  [phone]    Fax:  [phone]                                       Zehra Brunner   MRN: [id number]    Department:  [facility] Emergency Department   Date of Visit:  8/8/2018           After Visit Summary Signature Page     I have received my discharge instructions, and my questions have been answered. I have discussed any challenges I see with this plan with the nurse or doctor.    ..........................................................................................................................................  Patient/Patient Representative Signature      ..........................................................................................................................................  Patient Representative Print Name and Relationship to Patient    ..................................................               ................................................  Date                                            Time    ..........................................................................................................................................  Reviewed by Signature/Title    ...................................................              ..............................................  Date                                                            Time

## 2018-08-09 ENCOUNTER — TELEPHONE (OUTPATIENT)
Dept: EMERGENCY MEDICINE | Facility: CLINIC | Age: 28
End: 2018-08-09

## 2018-08-09 VITALS
HEART RATE: 102 BPM | OXYGEN SATURATION: 100 % | RESPIRATION RATE: 16 BRPM | DIASTOLIC BLOOD PRESSURE: 92 MMHG | SYSTOLIC BLOOD PRESSURE: 138 MMHG | TEMPERATURE: 100 F | BODY MASS INDEX: 29.41 KG/M2 | WEIGHT: 183 LBS | HEIGHT: 66 IN

## 2018-08-09 LAB
HIV 1+2 AB+HIV1 P24 AG SERPL QL IA: NONREACTIVE
INTERPRETATION ECG - MUSE: NORMAL

## 2018-08-09 PROCEDURE — 25000132 ZZH RX MED GY IP 250 OP 250 PS 637: Performed by: EMERGENCY MEDICINE

## 2018-08-09 RX ORDER — CLOTRIMAZOLE 1 %
CREAM (GRAM) TOPICAL 2 TIMES DAILY
Qty: 45 G | Refills: 0 | Status: SHIPPED | OUTPATIENT
Start: 2018-08-09 | End: 2018-08-24

## 2018-08-09 RX ADMIN — DOXYCYCLINE HYCLATE 100 MG: 100 TABLET, FILM COATED ORAL at 00:01

## 2018-08-09 NOTE — TELEPHONE ENCOUNTER
[facility] Emergency Department Lab result notification     Patient/parent Name  Zehra    Reason for call  Inquiring about HIV result    Lab Result  HIV result pending    Recommendations/Instructions  Will call you if positive.  You are welcome to call back later today or tomorrow for results    PCP follow-up Questions asked: NO    [RN Name]  Navin Morgan RN  [facility] RN  Lung Nodule and ED Lab Result RN  Epic pool (ED late result f/u RN): [id number]  [facility] INCIDENTAL RADIOLOGY F/U NURSES: P [id number]  # [phone]

## 2018-08-09 NOTE — TELEPHONE ENCOUNTER
Zehra notified of the Negative result for the lab test HIV antigen antibody combo.  See Result below.    Navin Morgan, RN  [facility] RN  Lung Nodule and ED Lab Result RN  Epic pool (ED late result f/u RN): [id number]  [facility] INCIDENTAL RADIOLOGY F/U NURSES: P [id number]  Ph# [phone]      HIV Antigen Antibody Combo [[id number]] (Order [id number])   Exam Information   Exam Date Exam Time Accession # Results    8/8/18 10:13 PM [id number]    Component Results   Component Value Flag Ref Range Units Status Collected Lab   HIV Antigen Antibody Combo Nonreactive  NR^Nonreactive     Final 08/08/2018 10:13 PM 51   Comment:   HIV-1 p24 Ag & HIV-1/HIV-2 Ab Not Detected

## 2018-08-09 NOTE — ED TRIAGE NOTES
Generalized body aches, constipation, fever. Seen yesterday for same,.  Has not been taking OTC meds for pain/fever, has not been taking medications prescribed at yesterdays visit.

## 2018-08-09 NOTE — DISCHARGE INSTRUCTIONS
Please make an appointment to follow up with your primary care provider in 7-10 ddays for recheck and repeat blood tests (CBC and hepatic panel)     Return to ER immediately if you develop: worsening symptoms, Fever > 101, persistent nausea or vomiting OR you have any other concerns about your health.    Discharge Instructions  Fever    You have been seen today for a fever. Fever is a normal body reaction to illness or inflammation. Fever is a sign that your body is doing what it should to fight something off. Fever is not dangerous, but it can make you feel miserable, and you will probably feel better if you get your fever to go down. Most infections are caused by a virus, and antibiotics will not help; your provider will tell you whether antibiotics are needed in your case. At this time your provider does not find that your fever is a sign of anything dangerous or life-threatening.  However, sometimes the signs of serious illness do not show up right away so additional care may be necessary.    Generally, every Emergency Department visit should have a follow-up clinic visit with either a primary or a specialty clinic/provider. Please follow-up as instructed by your emergency provider today.    What can I do to help myself?    Fill any prescriptions the provider gave you and take them right away--especially antibiotics.    If you have a fever, get plenty of rest and drink lots of fluids, especially water.    What clothes or blankets you have on will not change your fever. Do what is comfortable for you.    Bathing or sponging in lukewarm water may help you feel better.    Tylenol  (acetaminophen), Motrin  (ibuprofen), or Advil  (ibuprofen) help bring fever down and may help you feel more comfortable. Be sure to read and follow the package directions, and ask your provider if you have questions.    Do not drink alcohol.    Return to the Emergency Department if:    Any of the symptoms you have get much worse.    You  seem very sick, like being too weak to get up.    You have any new symptoms, especially serious things like abdominal (belly) pain or chest pain.    You are short of breath.    You have a severe headache.    You are vomiting (throwing up) so much you cannot keep fluids or medicines down.    You have confusion or seem unusually drowsy.    You have a seizure.    You have anything else that worries you.  If you were given a prescription for medicine here today, be sure to read all of the information (including the package insert) that comes with your prescription.  This will include important information about the medicine, its side effects, and any warnings that you need to know about.  The pharmacist who fills the prescription can provide more information and answer questions you may have about the medicine.  If you have questions or concerns that the pharmacist cannot address, please call or return to the Emergency Department.   Remember that you can always come back to the Emergency Department if you are not able to see your regular provider in the amount of time listed above, if you get any new symptoms, or if there is anything that worries you.

## 2018-08-09 NOTE — ED PROVIDER NOTES
History     Chief Complaint:  Generalized Body Aches      HPI   Zehra Brunner is a 27 year old female who presents with 5-6 days of symptoms including generalized weakness myalgias low-grade fever frontal headache nausea without vomiting left sided chest and abdominal pain.  Was evaluated at [facility] yesterday and had the below results.  She returns today because of increased fever and left-sided abdominal pain continued weakness and headache.  Denies dysuria or vaginal bleeding discharge odor or itching etc.  No new skin rashes or joint effusions.  No history of exposure to tick endemic areas.  Also denies any high risk history for HIV other than a new sexual partner 4 months ago.                      Patient was seen at [facility] ED on 8/8/2018. Diagnosis and notes provided below.    Imaging:  Radiology findings were communicated with the patient who voiced understanding of the findings.  CT Chest Pulmonary Embolism w Contrast  IMPRESSION: There is no pulmonary embolus, aortic aneurysm or  dissection. No acute abnormality.  Report per radiology      Laboratory:  Laboratory findings were communicated with the patient who voiced understanding of the findings.  CBC: WBC 3.5 (L),  (L) o/w WNL. (HGB 12.9)   BMP: potassium 3.2 (L) o/w WNL (Creatinine 0.92)  Troponin (Collected 0134): <0.015  TSH with free T4 reflex: 1.78  D Dimer (Collected 0111): 1.7 (H)  Mononucleosis: Negative  HCG Qualitative Blood: Negative    Medical Decision Making:   Zehra Brunner is a 27 year old female who is here for evaluation of pleuritic chest pain, fatigue, and body aches. Work up is not consistent with mono as an etiology of her symptoms. She is on birth control and d-dimer is elevated concerning for PE. CT was obtained showing no acute etiology for her symptoms. EKG showed no dysrhythmia or ischemia. Blood work was negative for anemia, significant electrolyte abnormalities, thyroid abnormalities, or  "cardiac injury. At this point, I recommended the use of NSAIDs and over the counter sleep agents. If these do not help, then a short prescription for trazodone was prescribed.     Allergies:  No Known Drug Allergies    Medications:    Flagyl   Desyrel    Past Medical History:    The patient denies any significant past medical history.    Past Surgical History:    The patient does not have any pertinent past surgical history.    Family History:    The patient denies any relevant family medical history.    Social History:  The patient was accompanied to the ED by self.  Smoking Status: Yes  Smokeless Tobacco: No  Alcohol Use: Yes  Marital Status:  Single [1]      Review of Systems   ROS: 10 point ROS neg other than the symptoms noted above in the HPI.      Physical Exam   Vitals:  Patient Vitals for the past 24 hrs:   BP Temp Temp src Pulse Resp SpO2 Height Weight   08/08/18 2306 - 100  F (37.8  C) Oral - - - - -   08/08/18 2123 (!) 138/92 101.2  F (38.4  C) Oral 102 18 99 % 1.676 m (5' 6\") 83 kg (183 lb)       Physical Exam   HENT:   Right Ear: External ear normal.   Left Ear: External ear normal.   Nose: Nose normal.   Eyes: Conjunctivae and lids are normal.   Neck: Neck supple. No tracheal deviation present.   Cardiovascular: Regular rhythm and intact distal pulses.    Pulmonary/Chest: Breath sounds normal. No respiratory distress.   Abdominal: Soft. She exhibits no distension. There is tenderness. There is no rebound and no guarding.       Musculoskeletal:   No peripheral edema   Neurological:   MAEE, no gross focal motor or sensory deficit   Skin: Skin is warm and dry. She is not diaphoretic.   Psychiatric: She has a normal mood and affect.   Nursing note and vitals reviewed.    Emergency Department Course     ECG:  ECG taken at 2124, ECG read at 2359  Sinus tachycardia   Nonspecific T wave abnormality   Abnormal ECG   Rate 105 bpm. FL interval 140. QRS duration 72. QT/QTc 324/428. P-R-T axes 63, 15, " 10.    Imaging:  Radiology findings were communicated with the patient who voiced understanding of the findings.  CT Abdomen Pelvis w/o contrast   IMPRESSION:  1. Small incompletely visualized linear density left lung base is  likely platelike atelectasis.  2. Borderline splenomegaly.  3. No acute abnormality in the abdomen or pelvis.  Reading per radiology.    Laboratory:  Laboratory findings were communicated with the patient who voiced understanding of the findings.  CBC: WBC 2.3 (L),  (L), Absolute neutrophils 1.4 (L), Absolute lymphocytes 0.6 (L)  O/W WNL (HGB 12.9)  CMP: Potassium 3.2 (L), Calcium 8.2 (L),  (H),  (H) O/W WNL (Creatinine 0.77)  Lipase: 65 (L)  UA with Microscopic: Protein albumin urine 30 (A), Urobilinogen mg/dL 4.0 (H), leukocytes esterase urine Trace (A), RBC/HPF 3 (H), Squamous epithelial/HPF urine 6 (H), Mucous Urine Present (A) O/W WNL     Interventions:  2221 Toradol 15 mg IV  2221 Tylenol 1000 mg Oral  2221 Lactated ringers bolus 1000 mL IV    Emergency Department Course:  Nursing notes and vitals reviewed.  I performed an exam of the patient as documented above.   IV was inserted and blood was drawn for laboratory testing, results above.  The patient provided a urine sample here in the emergency department. This was sent for laboratory testing, findings above.  The patient was sent for a CT Abdomen Pelvis while in the emergency department, results above.     I personally reviewed the laboratory results with the patient and answered all related questions prior to discharge.    Findings and plan explained to the patient. Patient discharged home with instructions regarding supportive care, medications, and reasons to return. The importance of close follow-up was reviewed.     Impression & Plan      Medical Decision Making:  Zehra Brunner is a 27 year old female who presents to the emergency department today with acute febrile illness, myalgias, and left sided  abdominal pain seen yesterday at [facility] had work up described above and returns with continuous symptoms and fever. No evidence of pharyngitis here, no meningismus, no concerns for deep space neck infections. She does have tenderness in left lower and mid quadrant of the abdominal. Concerned for pyelonephritis,  diverticulitis, possible LLL pneumonia, viral syndrome, tick borne illness is certainly a possibility and I have a low suspicion for CNS infection. I do no think she needs a lumbar puncture. Work up appears showing no evidence of urinary tract infection. Basic blood tests are unremarkable otherthan leukopenia and thrombocytopenia with mild transaminase elevation  likely viral in nature. CT scan of the abdomen showing no significant abdominal process. There may be a slight splenomegaly and today they do  see the a change in the left base of the lung.  Possibly a left lower lobe pneumonia versus a nonspecific viral process such as EBV or CMV.  Discussed treatment with doxycycline discharge home follow clinically.  We also added on HIV test which she consented to.      Diagnosis:    ICD-10-CM    1. Acute febrile illness R50.9 HIV Antigen Antibody Combo     HIV Antigen Antibody Combo     CANCELED: HIV Antigen Antibody Combo   2. Viral syndrome B34.9    3. Pneumonia of left lower lobe due to infectious organism (H) J18.1     POSSIBLE   4. Elevated liver enzymes R74.8    5. Leukopenia, unspecified type D72.819    6. Thrombocytopenia (H) D69.6        Disposition:   Discharged    CMS Diagnoses: None     Discharge Medications:    New Prescriptions    DOXYCYCLINE (VIBRAMYCIN) 100 MG CAPSULE    Take 1 capsule (100 mg) by mouth 2 times daily for 7 days     Scribe Disclosure:  Deacon MCCLURE, am serving as a scribe at 9:46 PM on 8/8/2018 to document services personally performed by Jesse Ortega, *, based on my observations and the provider's statements to me.  [facility] EMERGENCY  DEPARTMENT       Jesse Ortega MD  08/09/18 0013

## 2018-08-11 ENCOUNTER — HEALTH MAINTENANCE LETTER (OUTPATIENT)
Age: 28
End: 2018-08-11

## 2019-09-30 ENCOUNTER — HEALTH MAINTENANCE LETTER (OUTPATIENT)
Age: 29
End: 2019-09-30

## 2020-03-01 ENCOUNTER — HOSPITAL ENCOUNTER (EMERGENCY)
Facility: CLINIC | Age: 30
Discharge: HOME | End: 2020-03-01
Attending: EMERGENCY MEDICINE | Admitting: EMERGENCY MEDICINE
Payer: COMMERCIAL

## 2020-03-01 VITALS
SYSTOLIC BLOOD PRESSURE: 149 MMHG | RESPIRATION RATE: 18 BRPM | HEART RATE: 97 BPM | TEMPERATURE: 98.4 F | DIASTOLIC BLOOD PRESSURE: 94 MMHG | OXYGEN SATURATION: 98 %

## 2020-03-01 DIAGNOSIS — M79.12 STERNOCLEIDOMASTOID MUSCLE TENDERNESS: ICD-10-CM

## 2020-03-01 PROCEDURE — 25000132 ZZH RX MED GY IP 250 OP 250 PS 637: Performed by: EMERGENCY MEDICINE

## 2020-03-01 PROCEDURE — 99283 EMERGENCY DEPT VISIT LOW MDM: CPT

## 2020-03-01 RX ORDER — IBUPROFEN 600 MG/1
600 TABLET, FILM COATED ORAL ONCE
Status: COMPLETED | OUTPATIENT
Start: 2020-03-01 | End: 2020-03-01

## 2020-03-01 RX ORDER — ACETAMINOPHEN 500 MG
1000 TABLET ORAL ONCE
Status: COMPLETED | OUTPATIENT
Start: 2020-03-01 | End: 2020-03-01

## 2020-03-01 RX ORDER — CYCLOBENZAPRINE HCL 10 MG
10 TABLET ORAL 3 TIMES DAILY PRN
Qty: 20 TABLET | Refills: 0 | Status: SHIPPED | OUTPATIENT
Start: 2020-03-01 | End: 2020-06-29

## 2020-03-01 RX ADMIN — IBUPROFEN 600 MG: 600 TABLET ORAL at 13:45

## 2020-03-01 RX ADMIN — ACETAMINOPHEN 1000 MG: 500 TABLET, FILM COATED ORAL at 13:45

## 2020-03-01 ASSESSMENT — ENCOUNTER SYMPTOMS
WEAKNESS: 0
NUMBNESS: 0
FEVER: 0
NECK PAIN: 1
LIGHT-HEADEDNESS: 0
BACK PAIN: 0

## 2020-03-01 NOTE — ED PROVIDER NOTES
History   Chief Complaint:  Neck Pain    HPI   Zehra rBunner is a 29 year old female, who presents to the ED for evaluation of neck pain. The patient reports having neck pain for the past two weeks. She states she has had neck pain before, but nothing as severe as this. The patient also notes she has had right shoulder pain for 12 years and  feels as if this could be exacerbating her neck pain. She does endorse some pain down her right arm and into her hand, but no numbness. The pain in her right hand goes into her middle finger and feels as if it has been swollen. She does not have any numbness or pain in her arm currently. . The patient mentions taking Percocet for the pain, alone with Aleve, but has not had any relief in her symptoms. She denies any fever, back pain, lightheadedness, weakness, or any other acute symptoms.  Allergies:  No known drug allergies    Medications:    The patient is not currently taking any prescribed medications.    Past Medical History:    History reviewed.  No pertinent past medical history.    Past Surgical History:    History reviewed. No pertinent surgical history.    Family History:    History reviewed. No pertinent family history.     Social History:  Smoking status: Yes  Alcohol use: Yes  Drug use: Yes- Marijuana  PCP: Physician No Ref-Primary  Presents to the ED with significant other  Marital Status:  Single [1]    Review of Systems   Constitutional: Negative for fever.   Genitourinary: Positive for menstrual problem.   Musculoskeletal: Positive for neck pain. Negative for back pain.   Neurological: Negative for weakness, light-headedness and numbness.   All other systems reviewed and are negative.    Physical Exam     Patient Vitals for the past 24 hrs:   BP Temp Temp src Pulse Heart Rate Resp SpO2   03/01/20 1326 (!) 149/94 -- -- 97 -- -- 98 %   03/01/20 1324 (!) 145/105 -- -- 96 -- 18 97 %   03/01/20 1310 (!) 168/116 98.4  F (36.9  C) Oral -- 99 16 99 %     Physical  Exam  Constitutional:  Oriented to person, place, and time.      Appears well-developed and well-nourished.   HENT:   Head:    Normocephalic and atraumatic.   Right Ear:   Tympanic membrane and external ear normal.   Left Ear:   Tympanic membrane and external ear normal.   Mouth/Throat:   Oropharynx is clear and moist.      Mucous membranes are normal.   Eyes:    Conjunctivae normal and EOM are normal.      Pupils are equal, round, and reactive to light.   Neck:    Normal range of motion. Neck supple.   Cardiovascular:  Normal rate, regular rhythm, S1 normal and S2 normal.      No gallop and no friction rub. No murmur heard.  Pulmonary/Chest:  Breath sounds normal. No respiratory distress.      No wheezes. No rhonchi. No rales.   Abdominal:   Soft. No hepatosplenomegaly. No tenderness.      No rebound and no CVA tenderness.   Musculoskeletal:  Spasm of distal right sternocleidal mastoid muscle and right trapezius.  Neurological:   Alert and oriented to person, place, and time. Neurological exam of upper extremities intact.     GCS eye subscore is 4. GCS verbal subscore is 5.      GCS motor subscore is 6.   Skin:    Skin is warm and dry.   Psychiatric:   Normal mood and affect.      Speech is normal and behavior is normal.      Judgment and thought content normal.      Cognition and memory are normal.     Emergency Department Course   Interventions:  1345: Tylenol 1000 mg PO  1345: Ibuprofen 600 mg PO     Emergency Department Course:  Past medical records, nursing notes, and vitals reviewed.    1329 I performed an exam of the patient as documented above.     Findings and plan explained to the Patient. Patient discharged home with instructions regarding supportive care, medications, and reasons to return. The importance of close follow-up was reviewed.     Impression & Plan   Medical Decision Making:  The patient is a 29-year-old female with a 12 year history of right-sided shoulder/neck pain who comes in with a  two-week history of pain on the right side of her neck.  She denies any known trauma.  She has pain moving the neck.  She is not had any fever or symptoms of toxicity.  She is been taking Aleve and Tylenol as needed.  She has not seen her family doctor for this pain.  Her maximum tenderness is in the distal sternocleidomastoid which is quite tender to touch.  She also has significant right paracervical spasm.  I think she would benefit from physical therapy.  She can continue the Tylenol and ibuprofen.  I will also add Flexeril which she should not drink or drive or take her Tylenol PM.  She is advised this may or may not be helpful.  She should follow-up with her primary doctor in the next week and sooner if worse.    Diagnosis:    ICD-10-CM    1. Sternocleidomastoid muscle tenderness M79.12 [facility] RE[facility]     Disposition:  Discharged to home.    Discharge Medications:  New Prescriptions    CYCLOBENZAPRINE (FLEXERIL) 10 MG TABLET    Take 1 tablet (10 mg) by mouth 3 times daily as needed for muscle spasms Take one half tablet during day as needed every 6 hours and full tablet at bedtime as needed.     Scribe Disclosure:  I, Duran Denton, am serving as a scribe at 1:29 PM on 3/1/2020 to document services personally performed by Sonya Vegas MD based on my observations and the provider's statements to me.      Sonya Vegas MD  03/01/20 9334

## 2020-03-01 NOTE — ED AVS SNAPSHOT
Emergency Department  [address] [facility] [address]  Phone:  [phone]  Fax:  [phone]                                    Zehra Brunner   MRN: [id number]    Department:   Emergency Department   Date of Visit:  3/1/2020           After Visit Summary Signature Page    I have received my discharge instructions, and my questions have been answered. I have discussed any challenges I see with this plan with the nurse or doctor.    ..........................................................................................................................................  Patient/Patient Representative Signature      ..........................................................................................................................................  Patient Representative Print Name and Relationship to Patient    ..................................................               ................................................  Date                                   Time    ..........................................................................................................................................  Reviewed by Signature/Title    ...................................................              ..............................................  Date                                               Time          22EPIC Rev 08/18

## 2020-06-08 ENCOUNTER — HOSPITAL ENCOUNTER (EMERGENCY)
Facility: CLINIC | Age: 30
Discharge: HOME | End: 2020-06-08
Attending: EMERGENCY MEDICINE | Admitting: EMERGENCY MEDICINE
Payer: COMMERCIAL

## 2020-06-08 ENCOUNTER — APPOINTMENT (OUTPATIENT)
Dept: ULTRASOUND IMAGING | Facility: CLINIC | Age: 30
End: 2020-06-08
Attending: EMERGENCY MEDICINE
Payer: COMMERCIAL

## 2020-06-08 VITALS — DIASTOLIC BLOOD PRESSURE: 81 MMHG | HEART RATE: 73 BPM | SYSTOLIC BLOOD PRESSURE: 117 MMHG | OXYGEN SATURATION: 100 %

## 2020-06-08 DIAGNOSIS — Z33.1 PREGNANCY, INCIDENTAL: ICD-10-CM

## 2020-06-08 DIAGNOSIS — M54.50 ACUTE LOW BACK PAIN WITHOUT SCIATICA, UNSPECIFIED BACK PAIN LATERALITY: ICD-10-CM

## 2020-06-08 LAB
ALBUMIN UR-MCNC: 10 MG/DL
ANION GAP SERPL CALCULATED.3IONS-SCNC: 6 MMOL/L (ref 3–14)
APPEARANCE UR: ABNORMAL
B-HCG SERPL-ACNC: ABNORMAL IU/L (ref 0–5)
BACTERIA #/AREA URNS HPF: ABNORMAL /HPF
BASOPHILS # BLD AUTO: 0 10E9/L (ref 0–0.2)
BASOPHILS NFR BLD AUTO: 0.2 %
BILIRUB UR QL STRIP: NEGATIVE
BUN SERPL-MCNC: 6 MG/DL (ref 7–30)
CALCIUM SERPL-MCNC: 8.8 MG/DL (ref 8.5–10.1)
CHLORIDE SERPL-SCNC: 104 MMOL/L (ref 94–109)
CO2 SERPL-SCNC: 25 MMOL/L (ref 20–32)
COLOR UR AUTO: YELLOW
CREAT SERPL-MCNC: 0.63 MG/DL (ref 0.52–1.04)
DIFFERENTIAL METHOD BLD: NORMAL
EOSINOPHIL # BLD AUTO: 0 10E9/L (ref 0–0.7)
EOSINOPHIL NFR BLD AUTO: 0.6 %
ERYTHROCYTE [DISTWIDTH] IN BLOOD BY AUTOMATED COUNT: 11.8 % (ref 10–15)
GFR SERPL CREATININE-BSD FRML MDRD: >90 ML/MIN/{1.73_M2}
GLUCOSE SERPL-MCNC: 94 MG/DL (ref 70–99)
GLUCOSE UR STRIP-MCNC: NEGATIVE MG/DL
HCT VFR BLD AUTO: 37 % (ref 35–47)
HGB BLD-MCNC: 12.7 G/DL (ref 11.7–15.7)
HGB UR QL STRIP: NEGATIVE
IMM GRANULOCYTES # BLD: 0 10E9/L (ref 0–0.4)
IMM GRANULOCYTES NFR BLD: 0.2 %
KETONES UR STRIP-MCNC: 10 MG/DL
LEUKOCYTE ESTERASE UR QL STRIP: ABNORMAL
LYMPHOCYTES # BLD AUTO: 1.9 10E9/L (ref 0.8–5.3)
LYMPHOCYTES NFR BLD AUTO: 29.9 %
MCH RBC QN AUTO: 30.8 PG (ref 26.5–33)
MCHC RBC AUTO-ENTMCNC: 34.3 G/DL (ref 31.5–36.5)
MCV RBC AUTO: 90 FL (ref 78–100)
MONOCYTES # BLD AUTO: 0.4 10E9/L (ref 0–1.3)
MONOCYTES NFR BLD AUTO: 6.5 %
MUCOUS THREADS #/AREA URNS LPF: PRESENT /LPF
NEUTROPHILS # BLD AUTO: 3.9 10E9/L (ref 1.6–8.3)
NEUTROPHILS NFR BLD AUTO: 62.6 %
NITRATE UR QL: NEGATIVE
NRBC # BLD AUTO: 0 10*3/UL
NRBC BLD AUTO-RTO: 0 /100
PH UR STRIP: 5.5 PH (ref 5–7)
PLATELET # BLD AUTO: 207 10E9/L (ref 150–450)
POTASSIUM SERPL-SCNC: 3.3 MMOL/L (ref 3.4–5.3)
RBC # BLD AUTO: 4.13 10E12/L (ref 3.8–5.2)
RBC #/AREA URNS AUTO: 3 /HPF (ref 0–2)
SODIUM SERPL-SCNC: 135 MMOL/L (ref 133–144)
SOURCE: ABNORMAL
SP GR UR STRIP: 1.03 (ref 1–1.03)
SQUAMOUS #/AREA URNS AUTO: 7 /HPF (ref 0–1)
UROBILINOGEN UR STRIP-MCNC: NORMAL MG/DL (ref 0–2)
WBC # BLD AUTO: 6.2 10E9/L (ref 4–11)
WBC #/AREA URNS AUTO: 3 /HPF (ref 0–5)

## 2020-06-08 PROCEDURE — 25000132 ZZH RX MED GY IP 250 OP 250 PS 637: Performed by: EMERGENCY MEDICINE

## 2020-06-08 PROCEDURE — 80048 BASIC METABOLIC PNL TOTAL CA: CPT | Performed by: EMERGENCY MEDICINE

## 2020-06-08 PROCEDURE — 85025 COMPLETE CBC W/AUTO DIFF WBC: CPT | Performed by: EMERGENCY MEDICINE

## 2020-06-08 PROCEDURE — 99284 EMERGENCY DEPT VISIT MOD MDM: CPT | Mod: 25

## 2020-06-08 PROCEDURE — 81001 URINALYSIS AUTO W/SCOPE: CPT | Performed by: EMERGENCY MEDICINE

## 2020-06-08 PROCEDURE — 96361 HYDRATE IV INFUSION ADD-ON: CPT

## 2020-06-08 PROCEDURE — 84702 CHORIONIC GONADOTROPIN TEST: CPT | Performed by: EMERGENCY MEDICINE

## 2020-06-08 PROCEDURE — 25800030 ZZH RX IP 258 OP 636: Performed by: EMERGENCY MEDICINE

## 2020-06-08 PROCEDURE — 96360 HYDRATION IV INFUSION INIT: CPT

## 2020-06-08 PROCEDURE — 76801 OB US < 14 WKS SINGLE FETUS: CPT

## 2020-06-08 RX ORDER — ACETAMINOPHEN 500 MG
1000 TABLET ORAL ONCE
Status: COMPLETED | OUTPATIENT
Start: 2020-06-08 | End: 2020-06-08

## 2020-06-08 RX ADMIN — SODIUM CHLORIDE 1000 ML: 9 INJECTION, SOLUTION INTRAVENOUS at 10:10

## 2020-06-08 RX ADMIN — ACETAMINOPHEN 1000 MG: 500 TABLET, FILM COATED ORAL at 11:16

## 2020-06-08 ASSESSMENT — ENCOUNTER SYMPTOMS
ABDOMINAL PAIN: 0
APPETITE CHANGE: 1
FREQUENCY: 0
NUMBNESS: 1
DYSURIA: 0
FEVER: 0
COUGH: 0
BACK PAIN: 1

## 2020-06-08 NOTE — ED AVS SNAPSHOT
Emergency Department  [address] [facility] [address]  Phone:  [phone]  Fax:  [phone]                                    Zehra Brunner   MRN: [id number]    Department:   Emergency Department   Date of Visit:  6/8/2020           After Visit Summary Signature Page    I have received my discharge instructions, and my questions have been answered. I have discussed any challenges I see with this plan with the nurse or doctor.    ..........................................................................................................................................  Patient/Patient Representative Signature      ..........................................................................................................................................  Patient Representative Print Name and Relationship to Patient    ..................................................               ................................................  Date                                   Time    ..........................................................................................................................................  Reviewed by Signature/Title    ...................................................              ..............................................  Date                                               Time          22EPIC Rev 08/18

## 2020-06-08 NOTE — ED PROVIDER NOTES
History     Chief Complaint:  Back Pain       HPI   Zehra Brunner is a 29 year old female  who presents with back pain. The patient reports having general  low back pain. The patient notes that she had a positive home pregnancy test on 5/10/20. She states that she has had bilateral low back pain for the past 4 days. In addition, she also complains involuntary spasms in her right pinky finger, right shoulder pain, loss of appetite, and episodes of right foot numbness. She denies any fevers, cough, abdominal pain, vaginal bleeding, dysuria, frequency, or urgency. Her last period was on 4/10/20.  She has had back problems before but typically associated with sleeping differently.  She denies doing this in the past few days.  She denies any injury or trauma.    Allergies:  No Known Allergies     Medications:    cyclobenzaprine  Trazodone     Past Medical History:    Anxiety   Bipolar affective   Borderline personality disorder  Depression     Past Surgical History:    [place] teeth extraction      Migraine     Family History:    Asthma  Lupus   Thyroid disease    Social History:  Smoking Status: current  Smokeless Tobacco: Never Used  Alcohol Use: Yes  Drug Use: No    Review of Systems   Constitutional: Positive for appetite change. Negative for fever.   Respiratory: Negative for cough.    Gastrointestinal: Negative for abdominal pain.   Genitourinary: Negative for dysuria, frequency, urgency and vaginal bleeding.   Musculoskeletal: Positive for back pain (low).        Right shoulder pain   Neurological: Positive for numbness (right foot).        Right pinky spasms   All other systems reviewed and are negative.      Physical Exam     Patient Vitals for the past 24 hrs:   BP Pulse SpO2   20 1117 117/81 73 100 %        Physical Exam    Physical Exam   Constitutional:  Patient is oriented to person, place, and time. They appear well-developed and well-nourished.   HENT:   Mouth/Throat:   Oropharynx  is clear and moist.   Eyes:    Conjunctivae normal and EOM are normal. Pupils are equal, round, and reactive to light.   Neck:    Normal range of motion.   Cardiovascular: Normal rate, regular rhythm and normal heart sounds.  Exam reveals no gallop and no friction rub.  No murmur heard.  Pulmonary/Chest:  Effort normal and breath sounds normal. Patient has no wheezes. Patient has no rales.   Abdominal:   Soft. Bowel sounds are normal. Patient exhibits no mass. There is no rebound and no guarding. Indicates pain across the low abdomen.  Musculoskeletal:  Normal range of motion. Patient exhibits no edema. Indicates general pain across the low back mildly sensitive to soft touch.  No ecchymosis, edema, erythema, rash.  Neurological:   Patient is alert and oriented to person, place, and time. Patient has normal strength. No cranial nerve deficit or sensory deficit. GCS 15. Negative straight leg raise, normal sensation, equal patellar reflexes bilaterally, plantar and dorsi reflex against resistance without evidence of foot drop.   Skin:   Skin is warm and dry. No rash noted. No erythema.   Psychiatric:   Patient has a normal mood and affect. Patient's behavior is normal. Judgment and thought content normal.     Emergency Department Course     Imaging:  Radiology findings were communicated with the patient who voiced understanding of the findings.    US OB <14 Weeks W Transvaginal  Final Result  IMPRESSION: Single living intrauterine gestation at 8 weeks 3 days,  EDC 1/15/2021.  JAMILA MOULTON MD  Reading per radiology       Laboratory:  Laboratory findings were communicated with the patient who voiced understanding of the findings.    UA with micro: Urineketone 10 (A) Protein Albumin Urine 10 (A) Leukocyte esterase urine moderate (A) Bacteria few (A) RBC/HPF 3 (H) Squamous epithelial/HPF urine 7 (H) Mucous urine present (A)  o/w wnl/negative       CBC:  WBC 6.2, HGB 12.7, , o/w WNL     BMP: potassium 3.3 (L),  bun 6 (L), o/w WNL (Creatinine: 0.63)     HCG Quantitative: 47, 813 (H)     Interventions:  1010 0.9% sodium chloride BOLUS 1000 mL IV   1116 Tylenol 1000 mg oral     Emergency Department Course:  Past medical records, nursing notes, and vitals reviewed.    0947 I performed an exam of the patient as documented above.    IV was inserted and blood was drawn for laboratory testing, results above.     The patient provided a urine sample here in the emergency department. This was sent for laboratory testing, findings above.     The patient was sent for imaging while in the emergency department, results above.       1208 Patient rechecked and updated.       Findings and plan explained to the Patient. Patient discharged home with instructions regarding supportive care, medications, and reasons to return. The importance of close follow-up was reviewed.     Impression & Plan     Medical Decision Making:  Zehra Brunner is a 29 year old female who presents to the emergency department today stating that she is pregnant and has low back pain. No fever, cough, chest pain, dysuria, or urgency. She has no focal neurologic findings on exam that would be concerning for disc herniation. No symptoms concerning for diskitis osteomyelitis. Basic blood work was done as well as ultrasound. She has a normal 8 weeks 3 day IUP. Urinalysis is contaminated but with no acute symptoms, I do not think this reflects UTI. CBC is normal. Metabolic panel shows a potassium at 3.3 but no gross abnormalities. At this time, I suspect she has musculoskeletal pain. I talked to her about symptomatic care. She has a OB appointment tomorrow to get her established. At this time I feel it is safe for her to be discharged with close follow up.         Discharge Diagnosis:    ICD-10-CM    1. Acute low back pain without sciatica, unspecified back pain laterality  M54.5    2. Pregnancy, incidental  Z33.1        Disposition:  Discharged to home.    Vern  Disclosure:  I, Ruben Emmy, am serving as a scribe at 9:47 AM on 6/8/2020 to document services personally performed by Kate Javier MD based on my observations and the provider's statements to me.      6/8/2020   Kate Javier MD Bochert, Michelle Ann, MD  06/08/20 7444

## 2020-06-29 ENCOUNTER — HOSPITAL ENCOUNTER (EMERGENCY)
Facility: CLINIC | Age: 30
Discharge: HOME | End: 2020-06-29
Attending: EMERGENCY MEDICINE | Admitting: EMERGENCY MEDICINE
Payer: COMMERCIAL

## 2020-06-29 VITALS
DIASTOLIC BLOOD PRESSURE: 76 MMHG | HEART RATE: 86 BPM | TEMPERATURE: 97.6 F | SYSTOLIC BLOOD PRESSURE: 119 MMHG | RESPIRATION RATE: 18 BRPM | OXYGEN SATURATION: 100 %

## 2020-06-29 DIAGNOSIS — L02.215 PERINEAL ABSCESS, SUPERFICIAL: ICD-10-CM

## 2020-06-29 DIAGNOSIS — Z34.91 FIRST TRIMESTER PREGNANCY: ICD-10-CM

## 2020-06-29 PROCEDURE — 56405 I&D VULVA/PERINEAL ABSCESS: CPT

## 2020-06-29 PROCEDURE — 99283 EMERGENCY DEPT VISIT LOW MDM: CPT | Mod: 25

## 2020-06-29 NOTE — ED AVS SNAPSHOT
Emergency Department  [address] [facility] [address]  Phone:  [phone]  Fax:  [phone]                                    Zehra Brunner   MRN: [id number]    Department:   Emergency Department   Date of Visit:  6/29/2020           After Visit Summary Signature Page    I have received my discharge instructions, and my questions have been answered. I have discussed any challenges I see with this plan with the nurse or doctor.    ..........................................................................................................................................  Patient/Patient Representative Signature      ..........................................................................................................................................  Patient Representative Print Name and Relationship to Patient    ..................................................               ................................................  Date                                   Time    ..........................................................................................................................................  Reviewed by Signature/Title    ...................................................              ..............................................  Date                                               Time          22EPIC Rev 08/18

## 2020-06-30 NOTE — ED PROVIDER NOTES
History     Chief Complaint:  Wound Check     HPI   Zehra Brunner is a 29 year old female who presents with for evaluation of painful swelling to her perineum.  She thinks that she has an abscess.  She is never had anything like this.  She happens to be late in the first trimester pregnancy and specifically denies any vaginal bleeding, abdominal pain, vomiting, fevers, trouble breathing, or other symptoms.  She thinks that she needs to have her abscess drained.    Allergies:  Lamotrigine       Medications:    The patient is not currently taking any prescribed medications.      Past Medical History:    The patient denies any relevant past medical history.      Past Surgical History:    History reviewed. No pertinent past surgical history.      Family History:    History reviewed. No pertinent family history.      Social History:  Tobacco use:    Current every day cigar smoker   Alcohol use:    Negative   Drug use:    Negative     Review of Systems   All other systems reviewed and are negative.    Physical Exam   First Vitals:  BP: 128/75  Pulse: 108  Temp: 97.6  F (36.4  C)  Resp: 18  SpO2: 98 %      Physical Exam  General: nontoxic appearing woman sitting upright in room 15  HENT: mucous membranes moist  CV: rate as above  Resp: normal effort, speaks in full phrases, no cough observed, no stridor  GI: abdomen soft and nontender, no guarding, no palpable masses or uterine fundus  : normal external female genitalia,  Approximately 1.5 cm tender fluctuant swelling to the right medial buttock, lateral to and distinct from the anus and labia  Entire exam performed with female nurse as chaperone  MSK: no bony tenderness, no CVAT  Skin: appropriately warm and dry, no open wound, no erythema  Neuro: alert, clear speech, oriented  Psych: Pleasant, cooperative, makes jokes    Emergency Department Course      Procedure:  Procedure: Incision and Drainage   Performed by: Otis Sawyer MD    LOCATION:  R  perineum      ANESTHESIA:  Local field block using Marcaine 0.5% with epinephrine, total of 3 mLs    PROCEDURE:  Area was incised with # 11 Blade ([place]) with a Single Straight incision.  Wound treatment included Deloculation, Purulent Drainage and Expression of Material.  Packing consisted of Plain Gauze.  Appropriate dressing was applied to cover the area.    Patient Status:  Patient tolerated the procedure moderately well. There were no complications evident    Emergency Department Course:  Nursing notes and vitals reviewed.  2219: I performed an exam of the patient as documented above.     Findings and plan explained to the Patient. Patient discharged home with instructions regarding supportive care, medications, and reasons to return. The importance of close follow-up was reviewed.     Impression & Plan       Medical Decision Making:  History and exam are consistent with a localized superficial abscess, without evidence of perianal or perirectal involvement, nor extension towards the genitalia.  Location is lateral to the anus and I do not think this is a Bartholin's abscess, though I did feel that it was amenable to bedside incision and drainage.  There is no overlying cellulitis and there is no indication for antibiotic treatment.  Packing was placed.  She happens to be late in the first trimester pregnancy but no vaginal bleeding, abdominal pain, or other symptoms to suggest an acute of centric emergency.  Close follow-up through OB in the next 2 days for wound recheck was advised.  She was discharged home in improved condition.       Diagnosis:    ICD-10-CM   1. Perineal abscess, superficial  L02.215   2. First trimester pregnancy  Z34.91      Disposition:  Discharged to home.      This record was created at least in part using electronic voice recognition software, so please excuse any typographical errors.      I, Rasta Shipley, am serving as a scribe at 10:19 PM on 6/29/2020 to document services  personally performed by Dr. Sawyer, based on my observations and the provider's statements to me.       EMERGENCY DEPARTMENT       Otis Sawyer MD  06/30/20 9617

## 2020-07-13 ENCOUNTER — HOSPITAL ENCOUNTER (EMERGENCY)
Facility: CLINIC | Age: 30
Discharge: LEFT BEFORE DISCHARGE | End: 2020-07-13
Admitting: EMERGENCY MEDICINE
Payer: COMMERCIAL

## 2020-07-13 ENCOUNTER — NURSE TRIAGE (OUTPATIENT)
Dept: NURSING | Facility: CLINIC | Age: 30
End: 2020-07-13

## 2020-07-13 VITALS
RESPIRATION RATE: 20 BRPM | HEIGHT: 65 IN | TEMPERATURE: 98.8 F | DIASTOLIC BLOOD PRESSURE: 66 MMHG | WEIGHT: 192 LBS | BODY MASS INDEX: 31.99 KG/M2 | HEART RATE: 90 BPM | SYSTOLIC BLOOD PRESSURE: 109 MMHG | OXYGEN SATURATION: 88 %

## 2020-07-13 PROCEDURE — 40000268 ZZH STATISTIC NO CHARGES

## 2020-07-13 ASSESSMENT — MIFFLIN-ST. JEOR: SCORE: 1596.79

## 2020-07-14 NOTE — TELEPHONE ENCOUNTER
Patient reports that she is in severe pain.  Woke up this morning at 643 and stomach hurt, went to MD this morning, no UTI.  Reports that could be round ligament pain and was advised to call if lasted 1 hour or more.     Left side of lower abdomen and radiates to vagina.  Can barely move. Tylenol not helping.  Patient 13 weeks, 3 days pregnant.     Pain 10/10 currently and all day.  Constant.     Advised to see HCP within 4 hours per protocol.      Kate Alexander RN/[facility] Nurse Advisors    COVID 19 Nurse Triage Plan/Patient Instructions    Please be aware that novel coronavirus (COVID-19) may be circulating in the community. If you develop symptoms such as fever, cough, or SOB or if you have concerns about the presence of another infection including coronavirus (COVID-19), please contact your health care provider or visit [email or web address].     Disposition/Instructions    ED Visit recommended. Follow protocol based instructions.     Bring Your Own Device:  Please also bring your smart device(s) (smart phones, tablets, laptops) and their charging cables for your personal use and to communicate with your care team during your visit.    Thank you for taking steps to prevent the spread of this virus.  o Limit your contact with others.  o Wear a simple mask to cover your cough.  o Wash your hands well and often.    Resources    [facility]: About COVID-19: [email or web address]    CDC: What to Do If You're Sick: www.cdc.gov/coronavirus/2019-ncov/about/steps-when-sick.html    CDC: Ending Home Isolation: www.cdc.gov/coronavirus/2019-ncov/hcp/disposition-in-home-patients.html     CDC: Caring for Someone: www.cdc.gov/coronavirus/2019-ncov/if-you-are-sick/care-for-someone.html     [facility]: Interim Guidance for Hospital Discharge to Home: www.health.[facility].mn.[place]/diseases/coronavirus/hcp/hospdischarge.pdf    [facility] clinical trials (COVID-19 research studies):  "clinicalaffairs.[facility]-clinical-trials     Below are the COVID-19 hotlines at the [place] Department of Health ([facility]). Interpreters are available.   o For health questions: Call 741-575-4279 or 1-122.919.6843 (7 a.m. to 7 p.m.)  o For questions about schools and childcare: Call 840-730-5491 or 1-984.778.2287 (7 a.m. to 7 p.m.)     Additional Information    Negative: Shock suspected (e.g., cold/pale/clammy skin, too weak to stand, low BP, rapid pulse)    Negative: Difficult to awaken or acting confused (e.g., disoriented, slurred speech)    Negative: Passed out (i.e., lost consciousness, collapsed and was not responding)    Negative: Sounds like a life-threatening emergency to the triager    Negative: Chest pain    Negative: Pain is mainly in upper abdomen  (if needed ask: \"is it mainly above the belly button?\")    Negative: Followed an abdomen (stomach) injury    [1] Abdominal pain AND [2] pregnant < 20 weeks    Negative: Passed out (i.e., lost consciousness, collapsed and was not responding)    Negative: Shock suspected (e.g., cold/pale/clammy skin, too weak to stand, low BP, rapid pulse)    Negative: Difficult to awaken or acting confused (e.g., disoriented, slurred speech)    Negative: Sounds like a life-threatening emergency to the triager    Negative: Followed an abdomen (stomach) injury    Negative: [1] Abdominal pain AND [2] pregnant > 20 weeks    Negative: MODERATE-SEVERE abdominal pain (e.g., interferes with normal activities, awakens from sleep)    Negative: [1] SEVERE vaginal bleeding (i.e., soaking 2 pads / hour, large blood clots) AND [2] present 2 or more hours    Negative: [1] MODERATE vaginal bleeding (i.e., soaking 1 pad / hour; clots) AND [2] present > 6 hours    Negative: [1] MODERATE vaginal bleeding (i.e., soaking 1 pad / hour; clots) AND [2] pregnant > 12 weeks    Negative: Passed tissue (e.g., gray-white)    Negative: [1] Vomiting AND [2] contains red blood or black (\"coffee ground\") " material  (Exception: few red streaks in vomit that only happened once)    Negative: Shoulder pain    Negative: Lightheadedness or dizziness (e.g., feels like passing out)    Negative: Patient sounds very sick or weak to the triager    [1] Constant abdominal pain AND [2] present > 2 hours    Protocols used: ABDOMINAL PAIN - FEMALE-A-AH, PREGNANCY - ABDOMINAL PAIN LESS THAN 20 WEEKS EGA-A-AH

## 2021-01-15 ENCOUNTER — HEALTH MAINTENANCE LETTER (OUTPATIENT)
Age: 31
End: 2021-01-15

## 2021-05-07 ENCOUNTER — NURSE TRIAGE (OUTPATIENT)
Dept: NURSING | Facility: CLINIC | Age: 31
End: 2021-05-07

## 2021-05-07 NOTE — TELEPHONE ENCOUNTER
Irregular periods since November and coming early.  This month she is having period that is about 10 days.  Still bleeding. She is also reporting not feeling right.  She has not been eating much since Monday.  She feels nauseous.  She has felt dizzy at times. She is not on birth control.  She would like to go to ED and due to her symptoms protocol states she should as well.  She agrees to this plan.     Reason for Disposition    Patient sounds very sick or weak to the triager    Protocols used: VAGINAL BLEEDING - [id number]

## 2021-05-22 ENCOUNTER — HOSPITAL ENCOUNTER (EMERGENCY)
Facility: CLINIC | Age: 31
Discharge: HOME | End: 2021-05-22
Attending: EMERGENCY MEDICINE | Admitting: EMERGENCY MEDICINE
Payer: COMMERCIAL

## 2021-05-22 VITALS
WEIGHT: 175 LBS | HEART RATE: 81 BPM | HEIGHT: 66 IN | TEMPERATURE: 96.7 F | DIASTOLIC BLOOD PRESSURE: 91 MMHG | SYSTOLIC BLOOD PRESSURE: 133 MMHG | OXYGEN SATURATION: 100 % | RESPIRATION RATE: 16 BRPM | BODY MASS INDEX: 28.12 KG/M2

## 2021-05-22 DIAGNOSIS — N92.1 MENORRHAGIA WITH IRREGULAR CYCLE: ICD-10-CM

## 2021-05-22 LAB
ALBUMIN UR-MCNC: 50 MG/DL
ANION GAP SERPL CALCULATED.3IONS-SCNC: 4 MMOL/L (ref 3–14)
APPEARANCE UR: ABNORMAL
BACTERIA #/AREA URNS HPF: ABNORMAL /HPF
BASOPHILS # BLD AUTO: 0 10E9/L (ref 0–0.2)
BASOPHILS NFR BLD AUTO: 0.5 %
BILIRUB UR QL STRIP: NEGATIVE
BUN SERPL-MCNC: 9 MG/DL (ref 7–30)
CALCIUM SERPL-MCNC: 9.1 MG/DL (ref 8.5–10.1)
CHLORIDE SERPL-SCNC: 105 MMOL/L (ref 94–109)
CO2 SERPL-SCNC: 28 MMOL/L (ref 20–32)
COLOR UR AUTO: YELLOW
CREAT SERPL-MCNC: 0.79 MG/DL (ref 0.52–1.04)
DIFFERENTIAL METHOD BLD: NORMAL
EOSINOPHIL # BLD AUTO: 0.1 10E9/L (ref 0–0.7)
EOSINOPHIL NFR BLD AUTO: 1.6 %
ERYTHROCYTE [DISTWIDTH] IN BLOOD BY AUTOMATED COUNT: 12.8 % (ref 10–15)
GFR SERPL CREATININE-BSD FRML MDRD: >90 ML/MIN/{1.73_M2}
GLUCOSE SERPL-MCNC: 96 MG/DL (ref 70–99)
GLUCOSE UR STRIP-MCNC: NEGATIVE MG/DL
HCG UR QL: NEGATIVE
HCT VFR BLD AUTO: 42.8 % (ref 35–47)
HGB BLD-MCNC: 14 G/DL (ref 11.7–15.7)
HGB UR QL STRIP: ABNORMAL
IMM GRANULOCYTES # BLD: 0 10E9/L (ref 0–0.4)
IMM GRANULOCYTES NFR BLD: 0.2 %
KETONES UR STRIP-MCNC: NEGATIVE MG/DL
LEUKOCYTE ESTERASE UR QL STRIP: ABNORMAL
LYMPHOCYTES # BLD AUTO: 2.5 10E9/L (ref 0.8–5.3)
LYMPHOCYTES NFR BLD AUTO: 31.2 %
MCH RBC QN AUTO: 31.7 PG (ref 26.5–33)
MCHC RBC AUTO-ENTMCNC: 32.7 G/DL (ref 31.5–36.5)
MCV RBC AUTO: 97 FL (ref 78–100)
MONOCYTES # BLD AUTO: 0.5 10E9/L (ref 0–1.3)
MONOCYTES NFR BLD AUTO: 6.4 %
MUCOUS THREADS #/AREA URNS LPF: PRESENT /LPF
NEUTROPHILS # BLD AUTO: 4.9 10E9/L (ref 1.6–8.3)
NEUTROPHILS NFR BLD AUTO: 60.1 %
NITRATE UR QL: NEGATIVE
NRBC # BLD AUTO: 0 10*3/UL
NRBC BLD AUTO-RTO: 0 /100
PH UR STRIP: 6 PH (ref 5–7)
PLATELET # BLD AUTO: 211 10E9/L (ref 150–450)
POTASSIUM SERPL-SCNC: 3.5 MMOL/L (ref 3.4–5.3)
RBC # BLD AUTO: 4.42 10E12/L (ref 3.8–5.2)
RBC #/AREA URNS AUTO: 2 /HPF (ref 0–2)
SODIUM SERPL-SCNC: 137 MMOL/L (ref 133–144)
SOURCE: ABNORMAL
SP GR UR STRIP: 1.03 (ref 1–1.03)
SQUAMOUS #/AREA URNS AUTO: 13 /HPF (ref 0–1)
UROBILINOGEN UR STRIP-MCNC: 3 MG/DL (ref 0–2)
WBC # BLD AUTO: 8.1 10E9/L (ref 4–11)
WBC #/AREA URNS AUTO: 4 /HPF (ref 0–5)

## 2021-05-22 PROCEDURE — 81025 URINE PREGNANCY TEST: CPT | Performed by: EMERGENCY MEDICINE

## 2021-05-22 PROCEDURE — 81001 URINALYSIS AUTO W/SCOPE: CPT | Performed by: EMERGENCY MEDICINE

## 2021-05-22 PROCEDURE — 99283 EMERGENCY DEPT VISIT LOW MDM: CPT

## 2021-05-22 PROCEDURE — 85025 COMPLETE CBC W/AUTO DIFF WBC: CPT | Performed by: EMERGENCY MEDICINE

## 2021-05-22 PROCEDURE — 80048 BASIC METABOLIC PNL TOTAL CA: CPT | Performed by: EMERGENCY MEDICINE

## 2021-05-22 ASSESSMENT — MIFFLIN-ST. JEOR: SCORE: 1530.54

## 2021-05-22 NOTE — ED TRIAGE NOTES
ABCs intact. Pt LMP started 4/28. Pt states she menstrual cycle lasts about 7 days. Pt states she bled from 4/28-5/12. Pt states she started bleeding again yesterday. Pt has taken multiple pregnancy test which were negative. Pt states she also went and had STD tests which were also negative. Denies urinary symptoms. Pt vomited x 4 yesterday. C/o ongoing bleeding, lower mid back pain, and RLQ abdominal pain.

## 2021-05-22 NOTE — ED PROVIDER NOTES
"  History   Chief Complaint  Vaginal Bleeding    HPI  Zehra Brunner is a 30 year old female who presents for evaluation of vaginal bleeding. The patient reports that her last menstrual period started on  and lasted until . This was abnormal for her as she normally has 7 day periods. Her bleeding was also significantly heavier than usual during these two weeks. She then developed some bleeding again yesterday, which has been around her normal amount of period bleeding. She has been going through about 4 pads a day, noting that these are very absorbant. Denies any history of excessive bleeding. Denies currently being on birth control.     Review of Systems   Genitourinary: Positive for vaginal bleeding.   All other systems reviewed and are negative.    Allergies  Lamotrigine    Medications  The patient is currently on no regular medications.    Past Medical History  DANNY III with severe dysplasia   Anxiety  Severe cervical dysplasia   Bipolar affective  Borderline personality disorder  Mild dysplasia   Depression  STD  Obesity    Past Surgical History  [place] teeth extraction      Family History  Asthma    Social History  Presents to the ED alone.     Physical Exam     Patient Vitals for the past 24 hrs:   BP Temp Temp src Pulse Resp SpO2 Height Weight   21 1424 (!) 133/91 96.7  F (35.9  C) Temporal 81 18 100 % 1.676 m (5' 6\") 79.4 kg (175 lb)     Physical Exam  Constitutional: Alert, attentive, GCS 15  Eyes: EOM are normal, anicteric, conjugate gaze  CV: Distal extremities warm well perfused  Chest: Unlabored breathing on room air  GI:  non tender. No distension. No guarding or rebound.    Neurological: Alert, attentive, moving all extremities equally.   Skin: Skin is warm and dry.    Emergency Department Course   Laboratory:  CBC: WBC: 8.1, HGB: 14.0, PLT: 211  BMP: All WNL (Creatinine: 0.79)    HCG qualitative urine: negative  UA with Microscopic: blood large (A), protein albumin 50 (A), " urobilinogen 3.0 (H), leukocyte esterase trace (A), bacteria moderate (A), squamous epithelial 13 (H), mucous present (A), o/w WNL    Emergency Department Course:  Reviewed:  I reviewed nursing notes, vitals and past medical history    Assessments:  1606 I physically examined the patient as documented above.    Disposition:  The patient was discharged to home.     Impression & Plan   Medical Decision Making:  Zehra Brunner is a 30 year old female with a history of metrorrhagia presenting for evaluation of vaginal bleeding without significant pain. She reports two weeks of bleeding, last month into this month, that resolved and then occurred again today. She is changing her pad up to 4 times a day, her hemoglobin is stable as are her vital signs. Pregnancy test is negative. She has no other vaginal discharge and was recently tested for STI's. Pelvic exam was deferred. I discussed with her that her next step would be an ultrasound although she elected to wait and follow up with her OBGYN for this. I recommended Tylenol and Ibuprofen for cramping, return precautions were reviewed and she plans to follow up with OBGYN.     Diagnosis:    ICD-10-CM    1. Menorrhagia with irregular cycle  N92.1      Zacarias Charles MD  [employer]  11:30 PM 05/22/21     Scribe Disclosure:  I, Harsh Chavez, am serving as a scribe at 4:00 PM on 5/22/2021 to document services personally performed by Zacarias Charles MD based on my observations and the provider's statements to me.      Zacarias Charles MD  05/22/21 2505

## 2021-05-22 NOTE — DISCHARGE INSTRUCTIONS
You should take Tylenol ibuprofen as needed for any mild cramping.  She develop heavy bleeding where you soaked through 1 pad per hour for more than 2 hours, he should return to the emergency department.  I do recommend you call your GYN doctor to discuss your abnormal periods, they can be very helpful in initiating you on medications to help reduce this.

## 2021-07-21 ENCOUNTER — NURSE TRIAGE (OUTPATIENT)
Dept: NURSING | Facility: CLINIC | Age: 31
End: 2021-07-21

## 2021-07-22 NOTE — TELEPHONE ENCOUNTER
"\"I have really bad allergies and I have been congested. I was outdoors on Saturday all day. My AC filter was dirty and I cleaned it yesterday. I was blowing my nose a lot.. \"While blowing her nose today she states she had a really thick glob of mucous mixed with blood. \"Dark and red blood. There wasn't a lot.\" No further bleeding noted. The rest of the mucous was yellow. Sinus congestion on the right side, began 2 days ago. No pain. No fever noted. Her family told her to call the nurse line.    Triaged to a disposition of Home Care, given per guideline. She will continue to monitor, and says her sx are already much better.    Holli Hinojosa RN Triage Nurse Advisor 10:36 PM 7/21/2021    Reason for Disposition    [1] Sinus congestion as part of a cold AND [2] present < 10 days    [1] Mild-moderate nosebleed AND [2] bleeding stopped now    Additional Information    Negative: Severe difficulty breathing (e.g., struggling for each breath, speaks in single words)    Negative: Sounds like a life-threatening emergency to the triager    Negative: [1] Sinus infection AND [2] taking an antibiotic AND [3] symptoms continue    Negative: [1] Difficulty breathing AND [2] not from stuffy nose (e.g., not relieved by cleaning out the nose)    Negative: [1] SEVERE headache AND [2] fever    Negative: [1] Redness or swelling on the cheek, forehead or around the eye AND [2] fever    Negative: Fever > 104 F (40 C)    Negative: Patient sounds very sick or weak to the triager    Negative: [1] SEVERE pain AND [2] not improved 2 hours after pain medicine    Negative: [1] Redness or swelling on the cheek, forehead or around the eye AND [2] no fever    Negative: [1] Fever > 101 F (38.3 C) AND [2] age > 60    Negative: [1] Fever > 100.0 F (37.8 C) AND [2] bedridden (e.g., nursing home patient, CVA, chronic illness, recovering from surgery)    Negative: [1] Fever > 100.0 F (37.8 C) AND [2] diabetes mellitus or weak immune system (e.g., HIV " positive, cancer chemo, splenectomy, organ transplant, chronic steroids)    Negative: Fever present > 3 days (72 hours)    Negative: [1] Fever returns after gone for over 24 hours AND [2] symptoms worse or not improved    Negative: [1] Sinus pain (not just congestion) AND [2] fever    Negative: Earache    Negative: [1] Sinus congestion (pressure, fullness) AND [2] present > 10 days    Negative: [1] Nasal discharge AND [2] present > 10 days    Negative: [1] Using nasal washes and pain medicine > 24 hours AND [2] sinus pain (around cheekbone or eye) persists    Negative: Lots of coughing    Negative: Fainted or too weak to stand following large blood loss    Negative: Sounds like a life-threatening emergency to the triager    Negative: Nosebleed followed a nose injury    Negative: [1] Bleeding present > 30 minutes AND [2] using correct method of direct pressure    Negative: [1] Bleeding now AND [2] second call after being instructed in correct technique of direct pressure    Negative: Dizziness or lightheadedness    Negative: Pale skin (pallor) of new onset or worsening    Negative: [1] Has nasal packing (inserted by health care provider to control bleeding) AND [2] new rash    Negative: [1] Has nasal packing AND [2] now bleeding around the packing (Exception: few drops or ooze)    Negative: Patient sounds very sick or weak to the triager    Negative: [1] Bleeding recurs 3 or more times in 24 hours AND [2] direct pressure applied correctly    Negative: [1] Skin bruises or bleeding gums AND [2] not caused by an injury    Negative: [1] Large amount of blood has been lost (e.g., 1 cup or 240 ml) AND [2] bleeding now controlled (stopped)    Negative: [1] Has nasal packing AND [2] fever > 100.4 F (38.0 C)    Negative: Taking Coumadin (warfarin) or other strong blood thinner, or known bleeding disorder (e.g., thrombocytopenia)    Negative: Has nasal packing (inserted by health care provider to control bleeding)    Negative:  Hard-to-stop nosebleeds are a chronic symptom (recurrent or ongoing AND present > 4 weeks)    Negative: Easy bleeding present in other family members    Protocols used: SINUS PAIN OR CONGESTION-A-AH, NOSEBLEED-A-AH  COVID 19 Nurse Triage Plan/Patient Instructions    Please be aware that novel coronavirus (COVID-19) may be circulating in the community. If you develop symptoms such as fever, cough, or SOB or if you have concerns about the presence of another infection including coronavirus (COVID-19), please contact your health care provider or visit [email or web address].     Disposition/Instructions    Home care recommended. Follow home care protocol based instructions.    Thank you for taking steps to prevent the spread of this virus.  o Limit your contact with others.  o Wear a simple mask to cover your cough.  o Wash your hands well and often.    Resources    [facility]: About COVID-19: [email or web address]    CDC: What to Do If You're Sick: www.cdc.gov/coronavirus/2019-ncov/about/steps-when-sick.html    CDC: Ending Home Isolation: www.cdc.gov/coronavirus/2019-ncov/hcp/disposition-in-home-patients.html     CDC: Caring for Someone: www.cdc.gov/coronavirus/2019-ncov/if-you-are-sick/care-for-someone.html     [facility]: Interim Guidance for Hospital Discharge to Home: www.health.[facility].[place]/diseases/coronavirus/hcp/hospdischarge.pdf    [facility] clinical trials (COVID-19 research studies): clinicalaffairs.[facility]/umn-clinical-trials     Below are the COVID-19 hotlines at the [place] Department of Health ([facility]). Interpreters are available.   o For health questions: Call 288-340-6327 or 1-585.428.2834 (7 a.m. to 7 p.m.)  o For questions about schools and childcare: Call 102-271-2859 or 1-309.563.8744 (7 a.m. to 7 p.m.)

## 2021-10-24 ENCOUNTER — HEALTH MAINTENANCE LETTER (OUTPATIENT)
Age: 31
End: 2021-10-24

## 2022-01-04 ENCOUNTER — APPOINTMENT (OUTPATIENT)
Dept: GENERAL RADIOLOGY | Facility: CLINIC | Age: 32
End: 2022-01-04
Attending: PHYSICIAN ASSISTANT
Payer: COMMERCIAL

## 2022-01-04 ENCOUNTER — HOSPITAL ENCOUNTER (EMERGENCY)
Facility: CLINIC | Age: 32
Discharge: HOME | End: 2022-01-04
Attending: PHYSICIAN ASSISTANT | Admitting: PHYSICIAN ASSISTANT
Payer: COMMERCIAL

## 2022-01-04 VITALS
TEMPERATURE: 98.6 F | BODY MASS INDEX: 28.11 KG/M2 | RESPIRATION RATE: 22 BRPM | OXYGEN SATURATION: 100 % | DIASTOLIC BLOOD PRESSURE: 79 MMHG | SYSTOLIC BLOOD PRESSURE: 103 MMHG | HEART RATE: 71 BPM | WEIGHT: 174.16 LBS

## 2022-01-04 DIAGNOSIS — Z34.90 PREGNANT: ICD-10-CM

## 2022-01-04 DIAGNOSIS — U07.1 INFECTION DUE TO 2019 NOVEL CORONAVIRUS: ICD-10-CM

## 2022-01-04 LAB
ALBUMIN UR-MCNC: 30 MG/DL
ANION GAP SERPL CALCULATED.3IONS-SCNC: 6 MMOL/L (ref 3–14)
APPEARANCE UR: CLEAR
ATRIAL RATE - MUSE: 73 BPM
BASOPHILS # BLD MANUAL: 0 10E3/UL (ref 0–0.2)
BASOPHILS NFR BLD MANUAL: 1 %
BILIRUB UR QL STRIP: NEGATIVE
BUN SERPL-MCNC: 6 MG/DL (ref 7–30)
CALCIUM SERPL-MCNC: 8.9 MG/DL (ref 8.5–10.1)
CHLORIDE BLD-SCNC: 105 MMOL/L (ref 94–109)
CO2 SERPL-SCNC: 25 MMOL/L (ref 20–32)
COLOR UR AUTO: YELLOW
CREAT SERPL-MCNC: 0.57 MG/DL (ref 0.52–1.04)
DIASTOLIC BLOOD PRESSURE - MUSE: NORMAL MMHG
EOSINOPHIL # BLD MANUAL: 0.1 10E3/UL (ref 0–0.7)
EOSINOPHIL NFR BLD MANUAL: 2 %
ERYTHROCYTE [DISTWIDTH] IN BLOOD BY AUTOMATED COUNT: 11.2 % (ref 10–15)
FLUAV RNA SPEC QL NAA+PROBE: NEGATIVE
FLUBV RNA RESP QL NAA+PROBE: NEGATIVE
GFR SERPL CREATININE-BSD FRML MDRD: >90 ML/MIN/1.73M2
GLUCOSE BLD-MCNC: 88 MG/DL (ref 70–99)
GLUCOSE UR STRIP-MCNC: NEGATIVE MG/DL
HCT VFR BLD AUTO: 37.4 % (ref 35–47)
HGB BLD-MCNC: 12.7 G/DL (ref 11.7–15.7)
HGB UR QL STRIP: NEGATIVE
INTERPRETATION ECG - MUSE: NORMAL
KETONES UR STRIP-MCNC: 40 MG/DL
LEUKOCYTE ESTERASE UR QL STRIP: NEGATIVE
LYMPHOCYTES # BLD MANUAL: 1.2 10E3/UL (ref 0.8–5.3)
LYMPHOCYTES NFR BLD MANUAL: 37 %
MCH RBC QN AUTO: 31.5 PG (ref 26.5–33)
MCHC RBC AUTO-ENTMCNC: 34 G/DL (ref 31.5–36.5)
MCV RBC AUTO: 93 FL (ref 78–100)
MONOCYTES # BLD MANUAL: 0.1 10E3/UL (ref 0–1.3)
MONOCYTES NFR BLD MANUAL: 4 %
MUCOUS THREADS #/AREA URNS LPF: PRESENT /LPF
NEUTROPHILS # BLD MANUAL: 1.8 10E3/UL (ref 1.6–8.3)
NEUTROPHILS NFR BLD MANUAL: 56 %
NITRATE UR QL: NEGATIVE
P AXIS - MUSE: 31 DEGREES
PH UR STRIP: 6.5 [PH] (ref 5–7)
PLAT MORPH BLD: NORMAL
PLATELET # BLD AUTO: 167 10E3/UL (ref 150–450)
POTASSIUM BLD-SCNC: 3.5 MMOL/L (ref 3.4–5.3)
PR INTERVAL - MUSE: 154 MS
QRS DURATION - MUSE: 78 MS
QT - MUSE: 394 MS
QTC - MUSE: 434 MS
R AXIS - MUSE: 23 DEGREES
RBC # BLD AUTO: 4.03 10E6/UL (ref 3.8–5.2)
RBC MORPH BLD: NORMAL
RBC URINE: 1 /HPF
SARS-COV-2 RNA RESP QL NAA+PROBE: POSITIVE
SODIUM SERPL-SCNC: 136 MMOL/L (ref 133–144)
SP GR UR STRIP: 1.03 (ref 1–1.03)
SQUAMOUS EPITHELIAL: 3 /HPF
SYSTOLIC BLOOD PRESSURE - MUSE: NORMAL MMHG
T AXIS - MUSE: 30 DEGREES
TROPONIN I SERPL HS-MCNC: 3 NG/L
UROBILINOGEN UR STRIP-MCNC: 3 MG/DL
VENTRICULAR RATE- MUSE: 73 BPM
WBC # BLD AUTO: 3.3 10E3/UL (ref 4–11)
WBC URINE: 3 /HPF

## 2022-01-04 PROCEDURE — 71045 X-RAY EXAM CHEST 1 VIEW: CPT

## 2022-01-04 PROCEDURE — 80048 BASIC METABOLIC PNL TOTAL CA: CPT | Performed by: PHYSICIAN ASSISTANT

## 2022-01-04 PROCEDURE — 84484 ASSAY OF TROPONIN QUANT: CPT | Performed by: PHYSICIAN ASSISTANT

## 2022-01-04 PROCEDURE — 87636 SARSCOV2 & INF A&B AMP PRB: CPT | Performed by: PHYSICIAN ASSISTANT

## 2022-01-04 PROCEDURE — C9803 HOPD COVID-19 SPEC COLLECT: HCPCS

## 2022-01-04 PROCEDURE — 96361 HYDRATE IV INFUSION ADD-ON: CPT

## 2022-01-04 PROCEDURE — 250N000013 HC RX MED GY IP 250 OP 250 PS 637: Performed by: PHYSICIAN ASSISTANT

## 2022-01-04 PROCEDURE — 81003 URINALYSIS AUTO W/O SCOPE: CPT | Performed by: PHYSICIAN ASSISTANT

## 2022-01-04 PROCEDURE — 93005 ELECTROCARDIOGRAM TRACING: CPT

## 2022-01-04 PROCEDURE — 85027 COMPLETE CBC AUTOMATED: CPT | Performed by: PHYSICIAN ASSISTANT

## 2022-01-04 PROCEDURE — 99285 EMERGENCY DEPT VISIT HI MDM: CPT | Mod: 25

## 2022-01-04 PROCEDURE — 258N000003 HC RX IP 258 OP 636: Performed by: PHYSICIAN ASSISTANT

## 2022-01-04 PROCEDURE — 96360 HYDRATION IV INFUSION INIT: CPT

## 2022-01-04 PROCEDURE — 36415 COLL VENOUS BLD VENIPUNCTURE: CPT | Performed by: PHYSICIAN ASSISTANT

## 2022-01-04 RX ORDER — ACETAMINOPHEN 325 MG/1
650 TABLET ORAL ONCE
Status: COMPLETED | OUTPATIENT
Start: 2022-01-04 | End: 2022-01-04

## 2022-01-04 RX ADMIN — ACETAMINOPHEN 650 MG: 325 TABLET, FILM COATED ORAL at 12:11

## 2022-01-04 RX ADMIN — SODIUM CHLORIDE 1000 ML: 9 INJECTION, SOLUTION INTRAVENOUS at 12:12

## 2022-01-04 ASSESSMENT — ENCOUNTER SYMPTOMS
SHORTNESS OF BREATH: 1
NAUSEA: 1
DIZZINESS: 1
HEADACHES: 1
COUGH: 1
RHINORRHEA: 0
SORE THROAT: 0
DYSURIA: 0
FEVER: 0
LIGHT-HEADEDNESS: 1
VOMITING: 0

## 2022-01-04 NOTE — ED PROVIDER NOTES
"  History   Chief Complaint:  Shortness of Breath    The history is provided by the patient.      Zehra Brunner is a 31 year old female, 11 weeks pregnant, with history of asthma who presents with shortness of breath, dizziness, and lightheadedness. The patient states that \"I feel like I'm dying\", noting that \"I feel like I can't breathe\". She reports dizziness when standing up, at which point she describes that \"I immediately feel like I'm going to faint\". She additionally reports a headache over the past few days along with congestion and cough. The patient is currently 11 weeks pregnant and states that \"I've been losing a lot of weight\", also reporting some nausea from her pregnancy, for which she has been taking no medication. Concern over these persisting symptoms and unsure of whether \"I'm sick or it's just the pregnancy\" prompted her presentation to the ED at this time. The denies any emesis, fever, sore throat, rhinorrhea, or chest pain along with any dysuria, vaginal pain, or vaginal bleeding. She has had no recent sick contacts and notes her children at home are well. The patient works from home.    Review of Systems   Constitutional: Negative for fever.   HENT: Positive for congestion. Negative for rhinorrhea and sore throat.    Respiratory: Positive for cough and shortness of breath.    Cardiovascular: Negative for chest pain.   Gastrointestinal: Positive for nausea. Negative for vomiting.   Genitourinary: Negative for dysuria, vaginal bleeding and vaginal pain.   Neurological: Positive for dizziness, light-headedness and headaches.   All other systems reviewed and are negative.      Allergies:  Pineapple  Lamotrigine    Medications:  Albuterol  Doxylamine  Pyridoxine  Docusate  Cholecalciferol  Famotidine    Past Medical History:     DANNY III with severe dysplasia  Varicella  Anxiety  Nexplanon in place  Bipolar affective disorder  Borderline personality disorder  Asthma  Depression  Multiple " gestation  Migraine  Obesity  STD  UTI    Past Surgical History:     section  Starke teeth extraction  Colposcopy x3  LEEP procedure    Family History:    Asthma  Lupus  Thyroid disease  Diabetes mellitus  Hypertension  Psychiatry    Social History:  The patient presented alone.  The patient arrived in a private vehicle.  She is a mother and lives with her children at home.    Physical Exam     Patient Vitals for the past 24 hrs:   BP Temp Temp src Pulse Resp SpO2 Weight   22 1445 103/79 -- -- 71 -- 100 % --   22 1430 108/71 -- -- 73 -- 99 % --   22 1415 101/68 -- -- 68 -- 100 % --   22 1400 105/69 -- -- 73 -- 99 % --   22 1245 (!) 121/91 -- -- 73 -- 100 % --   22 1230 118/83 -- -- 75 -- 99 % --   22 1200 (!) 134/94 -- -- 71 -- 100 % --   22 1145 (!) 134/114 -- -- 75 -- 100 % --   22 1130 130/89 -- -- 79 -- 100 % --   22 1015 (!) 131/103 98.6  F (37  C) Oral 86 22 100 % 79 kg (174 lb 2.6 oz)     Physical Exam    Constitutional: Pleasant. Cooperative.   Eyes: Pupils equally round and reactive  HENT: Head is normal in appearance. Slightly dry mucous membranes.  Cardiovascular: Regular rate and rhythm and without murmurs.  Respiratory: Normal respiratory effort, lungs are clear bilaterally.  GI: Abdomen is soft, non-tender, non-distended. No guarding, rebound, or rigidity.  Musculoskeletal: No asymmetry of the lower extremities, no tenderness to palpation.   Skin: Normal, without rash.  Neurologic: Cranial nerves grossly intact, normal cognition, no focal deficits. Alert and oriented x 3.   Psychiatric: Normal affect.  Nursing notes and vital signs reviewed.    Emergency Department Course     ECG  ECG obtained at 1156, ECG read at 1234  Normal sinus rhythm.  Septal infarct, age undetermined.  Abnormal ECG.   No significant change as compared to prior, dated 18.  Rate 73 bpm. OK interval 154 ms. QRS duration 78 ms. QT/QTc 394/434 ms. P-R-T axes 31  23 30.     Imaging:  XR Chest Port 1 View   Final Result   IMPRESSION: Unremarkable single view of the chest.      JAMILA MOULTON MD            SYSTEM ID:  [id number]        Report per radiology    Laboratory:  Labs Ordered and Resulted from Time of ED Arrival to Time of ED Departure   INFLUENZA A/B & SARS-COV2 PCR MULTIPLEX - Abnormal       Result Value    Influenza A PCR Negative      Influenza B PCR Negative      SARS CoV2 PCR Positive (*)    BASIC METABOLIC PANEL - Abnormal    Sodium 136      Potassium 3.5      Chloride 105      Carbon Dioxide (CO2) 25      Anion Gap 6      Urea Nitrogen 6 (*)     Creatinine 0.57      Calcium 8.9      Glucose 88      GFR Estimate >90     ROUTINE UA WITH MICROSCOPIC REFLEX TO CULTURE - Abnormal    Color Urine Yellow      Appearance Urine Clear      Glucose Urine Negative      Bilirubin Urine Negative      Ketones Urine 40  (*)     Specific Gravity Urine 1.028      Blood Urine Negative      pH Urine 6.5      Protein Albumin Urine 30  (*)     Urobilinogen Urine 3.0 (*)     Nitrite Urine Negative      Leukocyte Esterase Urine Negative      Mucus Urine Present (*)     RBC Urine 1      WBC Urine 3      Squamous Epithelials Urine 3 (*)    CBC WITH PLATELETS AND DIFFERENTIAL - Abnormal    WBC Count 3.3 (*)     RBC Count 4.03      Hemoglobin 12.7      Hematocrit 37.4      MCV 93      MCH 31.5      MCHC 34.0      RDW 11.2      Platelet Count 167     TROPONIN I - Normal    Troponin I High Sensitivity 3     DIFFERENTIAL    % Neutrophils 56      % Lymphocytes 37      % Monocytes 4      % Eosinophils 2      % Basophils 1      Absolute Neutrophils 1.8      Absolute Lymphocytes 1.2      Absolute Monocytes 0.1      Absolute Eosinophils 0.1      Absolute Basophils 0.0      RBC Morphology Confirmed RBC Indices      Platelet Assessment        Value: Automated Count Confirmed. Platelet morphology is normal.     Emergency Department Course:    Reviewed:  I reviewed nursing notes, vitals, past medical  history and Care Everywhere.    Assessments:  1140 I obtained history and examined the patient as noted above.   1201 I rechecked the patient.  1241 I rechecked the patient and explained findings.  1449 I rechecked the patient and explained findings. I believe that they are safe for discharge at this time.    Consults:  1445 I spoke with [facility].    Interventions:  1211 Tylenol 650 mg PO  1212 NS 1 L IV    Disposition:  The patient was discharged to home.     Impression & Plan     Medical Decision Making:  Zehra Brunner is a 31 year old female who presents to the ED for evaluation of dyspnea, lightheadedness, headache, congestion, and cough.  Patient is currently 11 weeks pregnant.  See HPI as above for additional details.  Vitals and physical exam as above.  Differential was broad including pneumonia including COVID, pneumothorax, viral URI, atypical ACS, infectious etiology such as urinary tract infection, anemia, electrolyte abnormality, arrhythmia, among others.  Work-up obtained as above notable for positive Covid swab. Suspect this as etiology of patient's symptoms. Patient provided the above interventions with improvement of her symptoms. Patient tolerating p.o. in the ED. Of lower suspicion for alternative nefarious pathology at this time as per above differential. Discussed Covid precautions. Felt patient was safe for discharge to home. Advised her to call OBGYN to discuss COVID diagnosis in pregnancy. Discussed reasons to return. All questions answered. Patient discharged to home in stable condition.    Diagnosis:    ICD-10-CM    1. Infection due to 2019 novel coronavirus  U07.1    2. Pregnant  Z34.90      Scribe Disclosure:  I, Molly Ortiz, am serving as a scribe at 11:14 AM on 1/4/2022 to document services personally performed by Wilver Christina PA-C based on my observations and the provider's statements to me.    This record was created at least in part using electronic voice recognition  software, so please excuse any typographical errors.       Wilver Christina PA-C  01/04/22 2015

## 2022-01-04 NOTE — ED TRIAGE NOTES
SOB and headache since 12/27.  Cough and congested stated about three days later.  Has not been tested for COVID or for influenza.  Pt is 11 weeks pregnant.

## 2022-02-13 ENCOUNTER — HEALTH MAINTENANCE LETTER (OUTPATIENT)
Age: 32
End: 2022-02-13

## 2022-10-15 ENCOUNTER — HEALTH MAINTENANCE LETTER (OUTPATIENT)
Age: 32
End: 2022-10-15

## 2023-04-04 ENCOUNTER — OFFICE VISIT (OUTPATIENT)
Dept: URGENT CARE | Facility: URGENT CARE | Age: 33
End: 2023-04-04
Payer: COMMERCIAL

## 2023-04-04 VITALS
SYSTOLIC BLOOD PRESSURE: 158 MMHG | HEART RATE: 106 BPM | DIASTOLIC BLOOD PRESSURE: 111 MMHG | OXYGEN SATURATION: 98 % | RESPIRATION RATE: 20 BRPM | TEMPERATURE: 100.2 F

## 2023-04-04 DIAGNOSIS — R07.0 THROAT PAIN: ICD-10-CM

## 2023-04-04 DIAGNOSIS — R05.9 COUGH, UNSPECIFIED TYPE: ICD-10-CM

## 2023-04-04 DIAGNOSIS — J02.0 STREP THROAT: Primary | ICD-10-CM

## 2023-04-04 DIAGNOSIS — R52 GENERALIZED BODY ACHES: ICD-10-CM

## 2023-04-04 LAB
DEPRECATED S PYO AG THROAT QL EIA: POSITIVE
FLUAV AG SPEC QL IA: NEGATIVE
FLUBV AG SPEC QL IA: NEGATIVE
SARS-COV-2 RNA RESP QL NAA+PROBE: NEGATIVE

## 2023-04-04 PROCEDURE — 99203 OFFICE O/P NEW LOW 30 MIN: CPT | Mod: CS | Performed by: PHYSICIAN ASSISTANT

## 2023-04-04 PROCEDURE — U0005 INFEC AGEN DETEC AMPLI PROBE: HCPCS | Performed by: PHYSICIAN ASSISTANT

## 2023-04-04 PROCEDURE — 87804 INFLUENZA ASSAY W/OPTIC: CPT | Performed by: PHYSICIAN ASSISTANT

## 2023-04-04 PROCEDURE — U0003 INFECTIOUS AGENT DETECTION BY NUCLEIC ACID (DNA OR RNA); SEVERE ACUTE RESPIRATORY SYNDROME CORONAVIRUS 2 (SARS-COV-2) (CORONAVIRUS DISEASE [COVID-19]), AMPLIFIED PROBE TECHNIQUE, MAKING USE OF HIGH THROUGHPUT TECHNOLOGIES AS DESCRIBED BY CMS-2020-01-R: HCPCS | Performed by: PHYSICIAN ASSISTANT

## 2023-04-04 PROCEDURE — 87880 STREP A ASSAY W/OPTIC: CPT | Performed by: PHYSICIAN ASSISTANT

## 2023-04-04 RX ORDER — IBUPROFEN 800 MG/1
800 TABLET, FILM COATED ORAL EVERY 8 HOURS PRN
Qty: 40 TABLET | Refills: 0 | Status: SHIPPED | OUTPATIENT
Start: 2023-04-04 | End: 2023-04-14

## 2023-04-04 RX ORDER — AMOXICILLIN 875 MG
875 TABLET ORAL 2 TIMES DAILY
Qty: 20 TABLET | Refills: 0 | Status: SHIPPED | OUTPATIENT
Start: 2023-04-04 | End: 2023-04-14

## 2023-04-04 NOTE — PATIENT INSTRUCTIONS
(J02.0) Strep throat  (primary encounter diagnosis)  Comment:   Plan: amoxicillin (AMOXIL) 875 MG tablet        Salt water gargles    Replace toothbrush in 48 hours.      (R05.9) Cough, unspecified type  Comment:   Plan: Symptomatic COVID-19 Virus (Coronavirus) by PCR        Nose, Influenza A & B Antigen - Clinic Collect            (R07.0) Throat pain  Comment:   Plan: Streptococcus A Rapid Screen w/Reflex to PCR -         Clinic Collect, Influenza A & B Antigen -         Clinic Collect, ibuprofen (ADVIL/MOTRIN) 800 MG        tablet            (R52) Generalized body aches  Comment:   Plan: Influenza A & B Antigen - Clinic Collect

## 2023-04-04 NOTE — LETTER
April 4, 2023      Zehra Brunner  [address] [facility] [address]        To Whom It May Concern:    Zehra Brunner  was seen in clinic today and was diagnosed with strep throat.  She is considered contagious for the first 24 hours of her antibiotic.          Sincerely,        Jillian Euceda PA-C

## 2023-04-04 NOTE — PROGRESS NOTES
Patient presents with:  Cough: Sx's for 3 days  Pharyngitis  Musculoskeletal Problem: Body aches  Headache      (J02.0) Strep throat  (primary encounter diagnosis)  Comment:   Plan: amoxicillin (AMOXIL) 875 MG tablet        Salt water gargles    Replace toothbrush in 48 hours.      (R05.9) Cough, unspecified type  Comment:   Plan: Symptomatic COVID-19 Virus (Coronavirus) by PCR        Nose, Influenza A & B Antigen - Clinic Collect            (R07.0) Throat pain  Comment:   Plan: Streptococcus A Rapid Screen w/Reflex to PCR -         Clinic Collect, Influenza A & B Antigen -         Clinic Collect, ibuprofen (ADVIL/MOTRIN) 800 MG        tablet            (R52) Generalized body aches  Comment:   Plan: Influenza A & B Antigen - Clinic Collect            If not improving or if condition worsens, follow up with your Primary Care Provider      37 minutes spent by me on the date of the encounter doing chart review, review of test results, interpretation of tests, patient visit and documentation       SUBJECTIVE:   Zehra Brunner is a 32 year old female who presents today with throat pain, body aches and fever as well as cough for 3 days.      She is here with her 8 month old daughter today who has some runny nose, but no fever.       No past medical history on file.      Current Outpatient Medications   Medication Sig Dispense Refill     Multiple Vitamins-Iron (DAILY-NII/IRON/BETA-CAROTENE) TABS TAKE 1 TABLET BY MOUTH DAILY. (Patient not taking: Reported on 10/19/2020) 30 tablet 7     Social History     Tobacco Use     Smoking status: Never Smoker     Smokeless tobacco: Never Used   Substance Use Topics     Alcohol use: Not on file     Family History   Problem Relation Age of Onset     Diabetes Mother      Diabetes Father          ROS:    10 point ROS of systems including Constitutional, Eyes, Respiratory, Cardiovascular, Gastroenterology, Genitourinary, Integumentary, Muscularskeletal, Psychiatric ,neurological were  all negative except for pertinent positives noted in my HPI       OBJECTIVE:  BP (!) 158/111   Pulse 106   Temp 100.2  F (37.9  C)   Resp 20   LMP  (LMP Unknown)   SpO2 98%   Breastfeeding Yes   Physical Exam:  GENERAL APPEARANCE: healthy, alert and no distress  EYES: EOMI,  PERRL, conjunctiva clear  HENT: ear canals and TM's normal.  Nose  without ulcers, erythema or lesions.  OP is erythematous.    NECK: supple with anterior cervical lymphadenopathy  RESP: lungs clear to auscultation - no rales, rhonchi or wheezes  CV: regular rates and rhythm, normal S1 S2, no murmur noted  NEURO: Normal strength and tone, sensory exam grossly normal,  normal speech and mentation  SKIN: no suspicious lesions or rashes

## 2023-10-29 ENCOUNTER — HEALTH MAINTENANCE LETTER (OUTPATIENT)
Age: 33
End: 2023-10-29

## 2024-03-21 ENCOUNTER — OFFICE VISIT (OUTPATIENT)
Dept: FAMILY MEDICINE | Facility: CLINIC | Age: 34
End: 2024-03-21
Payer: COMMERCIAL

## 2024-03-21 VITALS
RESPIRATION RATE: 14 BRPM | DIASTOLIC BLOOD PRESSURE: 104 MMHG | OXYGEN SATURATION: 99 % | HEART RATE: 80 BPM | SYSTOLIC BLOOD PRESSURE: 151 MMHG | TEMPERATURE: 98.7 F

## 2024-03-21 DIAGNOSIS — R07.0 THROAT PAIN: Primary | ICD-10-CM

## 2024-03-21 LAB
DEPRECATED S PYO AG THROAT QL EIA: NEGATIVE
GROUP A STREP BY PCR: NOT DETECTED

## 2024-03-21 PROCEDURE — 87651 STREP A DNA AMP PROBE: CPT

## 2024-03-21 PROCEDURE — 99213 OFFICE O/P EST LOW 20 MIN: CPT

## 2024-03-21 NOTE — PATIENT INSTRUCTIONS
"To go to the ER if signs of dizziness, headache, change in vision, double vision, chest pain, numbness or tingling, change in speech. Call primary care provider to discuss blood pressure medication this afternoon.    Rapid strep test today is negative.   A strep PCR test was done, the results will be in mychart. If positive an antibiotic will be prescribed.   Drink plenty of fluids and rest.  May use salt water gargles- about 8 oz warm water with about 1 teaspoon salt  Sucrets and Cepacol spray are over the counter medications that numb the throat.  Over the counter pain relievers such as tylenol 500 mg tablets (if no liver problems) 2 tabs three times a day as needed may be used as needed.   Honey lemon tea helps to soothe the throat. \"Throat Coat\" tea is soothing as well.  Please follow up with primary care provider if not improving, worsening or new symptoms.  If you are unable to swallow or have difficulty breathing go to the ER.    "

## 2024-03-21 NOTE — PROGRESS NOTES
"Assessment & Plan     Throat pain    - Streptococcus A Rapid Scr w Reflx to PCR       Patient Instructions   To go to the ER if signs of dizziness, headache, change in vision, double vision, chest pain, numbness or tingling, change in speech. Call primary care provider to discuss blood pressure medication this afternoon.    Rapid strep test today is negative.   A strep PCR test was done, the results will be in mychart. If positive an antibiotic will be prescribed.   Drink plenty of fluids and rest.  May use salt water gargles- about 8 oz warm water with about 1 teaspoon salt  Sucrets and Cepacol spray are over the counter medications that numb the throat.  Over the counter pain relievers such as tylenol 500 mg tablets (if no liver problems) 2 tabs three times a day as needed may be used as needed.   Honey lemon tea helps to soothe the throat. \"Throat Coat\" tea is soothing as well.  Please follow up with primary care provider if not improving, worsening or new symptoms.  If you are unable to swallow or have difficulty breathing go to the ER.      Return in about 1 week (around 3/28/2024), or if symptoms worsen or fail to improve.    [facility]    Mirna Shahid is a 33 year old female who presents to clinic today for the following health issues:  Chief Complaint   Patient presents with    Throat Pain     X1wk, no fever, have not been taking BP medication due to dizziness and GI symptoms     HPI    Patient reports that she has had a mild scratchy sore throat, runny nose, and mild cough. Son had strep. No fever. Able to drink fluids.     Patient was in to see a primary care provider at the end of January for elevated BP. She was given a BP medication, she tried to take it but it caused dizziness, stomach upset, and she didn't feel well so she stopped taking it. Currently she has no change in vision, no double vision, no " headache, no chest pain, no change in speech, no numbness, no dizziness. Yesterday she felt blurry vision that lasted a short time, she has had some pain in her right shoulder that caused her right arm to feel numb previously, sometimes she feels her heart beating hard.           Objective    BP (!) 151/104   Pulse 80   Temp 98.7  F (37.1  C) (Tympanic)   Resp 14   SpO2 99%   Breastfeeding Yes   Physical Exam       Results for orders placed or performed in visit on 03/21/24 (from the past 24 hour(s))   Streptococcus A Rapid Scr w Reflx to PCR    Specimen: Throat; Swab   Result Value Ref Range    Group A Strep antigen Negative Negative

## 2024-04-25 ENCOUNTER — OFFICE VISIT (OUTPATIENT)
Dept: FAMILY MEDICINE | Facility: CLINIC | Age: 34
End: 2024-04-25
Payer: COMMERCIAL

## 2024-04-25 VITALS
DIASTOLIC BLOOD PRESSURE: 104 MMHG | TEMPERATURE: 101.2 F | HEART RATE: 118 BPM | OXYGEN SATURATION: 98 % | SYSTOLIC BLOOD PRESSURE: 144 MMHG

## 2024-04-25 DIAGNOSIS — R50.9 FEVER IN ADULT: ICD-10-CM

## 2024-04-25 DIAGNOSIS — J02.9 ACUTE SORE THROAT: ICD-10-CM

## 2024-04-25 DIAGNOSIS — R52 BODY ACHES: ICD-10-CM

## 2024-04-25 DIAGNOSIS — J03.90 TONSILLITIS: Primary | ICD-10-CM

## 2024-04-25 LAB
DEPRECATED S PYO AG THROAT QL EIA: NEGATIVE
FLUAV AG SPEC QL IA: NEGATIVE
FLUBV AG SPEC QL IA: NEGATIVE
GROUP A STREP BY PCR: NOT DETECTED

## 2024-04-25 PROCEDURE — 87651 STREP A DNA AMP PROBE: CPT

## 2024-04-25 PROCEDURE — 87635 SARS-COV-2 COVID-19 AMP PRB: CPT

## 2024-04-25 PROCEDURE — 87804 INFLUENZA ASSAY W/OPTIC: CPT

## 2024-04-25 PROCEDURE — 99213 OFFICE O/P EST LOW 20 MIN: CPT

## 2024-04-25 RX ORDER — TRANEXAMIC ACID 650 MG/1
1300 TABLET ORAL
COMMUNITY
Start: 2024-04-23

## 2024-04-25 RX ORDER — AMOXICILLIN 875 MG
875 TABLET ORAL 2 TIMES DAILY
Qty: 20 TABLET | Refills: 0 | Status: SHIPPED | OUTPATIENT
Start: 2024-04-25 | End: 2024-05-06

## 2024-04-25 RX ORDER — IBUPROFEN 200 MG
400 TABLET ORAL ONCE
Status: COMPLETED | OUTPATIENT
Start: 2024-04-25 | End: 2024-04-25

## 2024-04-25 RX ORDER — NIFEDIPINE 30 MG/1
30 TABLET, EXTENDED RELEASE ORAL
COMMUNITY
Start: 2024-01-30

## 2024-04-25 RX ADMIN — Medication 400 MG: at 13:36

## 2024-04-25 NOTE — PROGRESS NOTES
Assessment & Plan     Fever in adult    - Streptococcus A Rapid Scr w Reflx to PCR  - Influenza A/B antigen  - Group A Streptococcus PCR Throat Swab  - Symptomatic COVID-19 Virus (Coronavirus) by PCR Nose    Acute sore throat    - Streptococcus A Rapid Scr w Reflx to PCR  - Influenza A/B antigen  - Group A Streptococcus PCR Throat Swab  - Symptomatic COVID-19 Virus (Coronavirus) by PCR Nose    Body aches    - Symptomatic COVID-19 Virus (Coronavirus) by PCR Nose    Tonsillitis    - amoxicillin (AMOXIL) 875 MG tablet; Take 1 tablet (875 mg) by mouth 2 times daily for 10 days    Rapid flu test negative.  Rapid strep test negative.  Strep PCR pending as well as COVID PCR pending.  Treated for tonsillitis with amoxicillin twice daily for 10 days.  Recommend rest, fluids.  If symptoms become too severe she may need to go to the ED for evaluation and treatment.        Nicotine/Tobacco Cessation  She reports that she has been smoking cigars and vaping device. She has never used smokeless tobacco.  Nicotine/Tobacco Cessation Plan        Return in about 3 days (around 4/28/2024), or if symptoms worsen or fail to improve.    Mirna Shahid is a 33 year old, presenting for the following health issues:  Urgent Care and Pharyngitis (Sx started on Sat with a HA. Since pt has been getting worst having a HA, sore throat, body aches, runny nose. Tx- NONE)    HPI   Presents with 5-day history of sore throat, headache body aches and runny nose.  Her 20-month-old daughter was sick earlier this month with similar symptoms.  Was placed on an antibiotic and got better.  States throat is so sore it has been hard for her to drink anything in the past 24 hours.  No vomiting or diarrhea.      Review of Systems  Constitutional, HEENT, cardiovascular, pulmonary, gi and gu systems are negative, except as otherwise noted.      Objective    BP (!) 144/104   Pulse 118   Temp (!) 101.2  F (38.4  C) (Tympanic)   LMP  (LMP Unknown)   SpO2  98%   There is no height or weight on file to calculate BMI.  Physical Exam   GENERAL: alert and fatigued  EYES: Eyes grossly normal to inspection, PERRL and conjunctivae and sclerae normal  HENT: normal cephalic/atraumatic, ear canals and TM's normal, nose and mouth without ulcers or lesions, oropharynx clear, tonsillar hypertrophy, tonsillar erythema, and oral mucosa including tongue slightly dry.  NECK: bilateral anterior cervical adenopathy  RESP: lungs clear to auscultation - no rales, rhonchi or wheezes  CV: regular rates and rhythm, normal S1 S2, no S3 or S4, and no murmur, click or rub    Results for orders placed or performed in visit on 04/25/24   Streptococcus A Rapid Scr w Reflx to PCR     Status: Normal    Specimen: Throat; Swab   Result Value Ref Range    Group A Strep antigen Negative Negative   Influenza A/B antigen     Status: Normal    Specimen: Nose; Swab   Result Value Ref Range    Influenza A antigen Negative Negative    Influenza B antigen Negative Negative    Narrative    Test results must be correlated with clinical data. If necessary, results should be confirmed by a molecular assay or viral culture.             Signed Electronically by: [facility]

## 2024-04-26 LAB — SARS-COV-2 RNA RESP QL NAA+PROBE: NEGATIVE

## 2024-05-06 ENCOUNTER — OFFICE VISIT (OUTPATIENT)
Dept: FAMILY MEDICINE | Facility: CLINIC | Age: 34
End: 2024-05-06
Payer: COMMERCIAL

## 2024-05-06 VITALS
TEMPERATURE: 98.9 F | SYSTOLIC BLOOD PRESSURE: 112 MMHG | OXYGEN SATURATION: 99 % | HEART RATE: 91 BPM | DIASTOLIC BLOOD PRESSURE: 89 MMHG

## 2024-05-06 DIAGNOSIS — J02.9 ACUTE SORE THROAT: ICD-10-CM

## 2024-05-06 DIAGNOSIS — J03.90 TONSILLITIS WITH EXUDATE: Primary | ICD-10-CM

## 2024-05-06 LAB
DEPRECATED S PYO AG THROAT QL EIA: NEGATIVE
GROUP A STREP BY PCR: NOT DETECTED

## 2024-05-06 PROCEDURE — 87651 STREP A DNA AMP PROBE: CPT | Performed by: PHYSICIAN ASSISTANT

## 2024-05-06 PROCEDURE — 99213 OFFICE O/P EST LOW 20 MIN: CPT

## 2024-05-06 RX ORDER — CEFDINIR 300 MG/1
300 CAPSULE ORAL 2 TIMES DAILY
Qty: 20 CAPSULE | Refills: 0 | Status: SHIPPED | OUTPATIENT
Start: 2024-05-06 | End: 2024-05-16

## 2024-05-06 ASSESSMENT — ENCOUNTER SYMPTOMS
HEADACHES: 1
COUGH: 1
SORE THROAT: 1

## 2024-05-06 NOTE — LETTER
May 6, 2024      Zehra Brunner  [address] [facility] [address]        To Whom It May Concern:    Zehra Brunner  was seen on 5/6/24.  Please excuse her  until improvement of symptoms due to illness.        Sincerely,      Ken Warner PA-C   [facility]

## 2024-05-06 NOTE — PROGRESS NOTES
Assessment & Plan       ICD-10-CM    1. Tonsillitis with exudate  J03.90 cefdinir (OMNICEF) 300 MG capsule      2. Acute sore throat  J02.9 Streptococcus A Rapid Scr w Reflx to PCR     Group A Streptococcus PCR Throat Swab         Patient appears ill on exam and on to cover with penicillin for other causes of bacterial pharyngitis such as Fusobacterium necrophorum  in a Centor score of 4.  This bacteria is often implicated in cellulitic pharyngitis, Lemierre's and abscesses. We discussed these medications side effects and proper administration.      We discussed signs and symptoms that would warrant further evaluation from a health care provider. The plan of care was discussed.They understand and agree with the course of treatments. A printed summary was given including instructions and medications.      Mirna Shahid is a 33 year old female who presents to clinic today with family for the following health issues:  Chief Complaint   Patient presents with    Urgent Care    Cough     Exposed to her son who was tested positive for strep. Sx started again on Saturday.      Cough  This is a new problem. The current episode started 12 to 24 hours ago. The problem occurs constantly. The problem has been gradually worsening. The cough is Non-productive. Maximum temperature: feels feverish. Associated symptoms include headaches and sore throat.           Review of Systems   HENT:  Positive for sore throat.    Respiratory:  Positive for cough.    Neurological:  Positive for headaches.       Problem List:  2012-10: CARDIOVASCULAR SCREENING; LDL GOAL LESS THAN 160      No past medical history on file.    Social History     Tobacco Use    Smoking status: Every Day     Types: Cigars, Vaping Device    Smokeless tobacco: Never   Substance Use Topics    Alcohol use: Not Currently           Objective    /89   Pulse 91   Temp 98.9  F (37.2  C) (Tympanic)   LMP  (LMP Unknown)   SpO2 99%   Physical  Exam  Constitutional:       Appearance: Normal appearance.   HENT:      Head: Normocephalic and atraumatic.      Right Ear: Tympanic membrane, ear canal and external ear normal.      Left Ear: Tympanic membrane, ear canal and external ear normal.      Nose: Congestion present.      Mouth/Throat:      Pharynx: Oropharyngeal exudate and posterior oropharyngeal erythema present.   Eyes:      Conjunctiva/sclera: Conjunctivae normal.      Pupils: Pupils are equal, round, and reactive to light.   Cardiovascular:      Rate and Rhythm: Normal rate and regular rhythm.      Heart sounds: Normal heart sounds.   Pulmonary:      Effort: Pulmonary effort is normal.      Breath sounds: Normal breath sounds.   Musculoskeletal:      Cervical back: Normal range of motion and neck supple.   Lymphadenopathy:      Cervical: Cervical adenopathy present.   Skin:     General: Skin is warm and dry.   Neurological:      General: No focal deficit present.      Mental Status: She is alert and oriented to person, place, and time.   Psychiatric:         Mood and Affect: Mood normal.         Thought Content: Thought content normal.              Ken Warner PA-C

## 2024-05-13 ENCOUNTER — OFFICE VISIT (OUTPATIENT)
Dept: URGENT CARE | Facility: URGENT CARE | Age: 34
End: 2024-05-13
Payer: COMMERCIAL

## 2024-05-13 VITALS
WEIGHT: 188 LBS | BODY MASS INDEX: 30.34 KG/M2 | HEART RATE: 123 BPM | DIASTOLIC BLOOD PRESSURE: 106 MMHG | OXYGEN SATURATION: 99 % | SYSTOLIC BLOOD PRESSURE: 135 MMHG | TEMPERATURE: 97.6 F

## 2024-05-13 DIAGNOSIS — R21 RASH: Primary | ICD-10-CM

## 2024-05-13 PROCEDURE — 99000 SPECIMEN HANDLING OFFICE-LAB: CPT | Performed by: FAMILY MEDICINE

## 2024-05-13 PROCEDURE — 87252 VIRUS INOCULATION TISSUE: CPT | Mod: 90 | Performed by: FAMILY MEDICINE

## 2024-05-13 PROCEDURE — 99213 OFFICE O/P EST LOW 20 MIN: CPT | Performed by: FAMILY MEDICINE

## 2024-05-13 RX ORDER — VALACYCLOVIR HYDROCHLORIDE 1 G/1
1000 TABLET, FILM COATED ORAL 2 TIMES DAILY
Qty: 20 TABLET | Refills: 0 | Status: SHIPPED | OUTPATIENT
Start: 2024-05-13 | End: 2024-05-23

## 2024-05-13 NOTE — PROGRESS NOTES
SUBJECTIVE: Zehra Brunner is a 33 year old female presenting with a chief complaint of rash left labia.  Onset of symptoms was day(s) ago.  Painful, possible nick from clippers    No past medical history on file.  Allergies   Allergen Reactions    Pineapple Anaphylaxis     Tongue swells, burns, onset childhood. Facial swelling as adult.    Lamotrigine Rash     Social History     Tobacco Use    Smoking status: Every Day     Types: Cigars, Vaping Device    Smokeless tobacco: Never   Substance Use Topics    Alcohol use: Not Currently       ROS:  SKIN: no rash  GI: no vomiting    OBJECTIVE:  BP (!) 135/106   Pulse (!) 123   Temp 97.6  F (36.4  C) (Tympanic)   Wt 85.3 kg (188 lb)   LMP  (LMP Unknown)   SpO2 99%   Breastfeeding Yes   BMI 30.34 kg/m  GENERAL APPEARANCE: healthy, alert and no distress  SKIN: left inner labia redness      ICD-10-CM    1. Rash  R21 valACYclovir (VALTREX) 1000 mg tablet     Viral Culture Non-respiratory          Fluids/Rest, f/u if worse/not any better

## 2024-05-16 ENCOUNTER — TELEPHONE (OUTPATIENT)
Dept: INTERNAL MEDICINE | Facility: CLINIC | Age: 34
End: 2024-05-16
Payer: COMMERCIAL

## 2024-05-16 NOTE — TELEPHONE ENCOUNTER
"Pt called the clinic re: test results from 5/13.  Advised pt that labs are still \"in process\" as they had to be sent to another facility for processing per chart review.   Advised pt to follow-up on Monday if no results by then.   She verbalized understanding and agreement to this plan.     Thank you,  Ritu Langford RN    "

## 2024-06-14 ENCOUNTER — OFFICE VISIT (OUTPATIENT)
Dept: FAMILY MEDICINE | Facility: CLINIC | Age: 34
End: 2024-06-14
Payer: COMMERCIAL

## 2024-06-14 VITALS
OXYGEN SATURATION: 98 % | BODY MASS INDEX: 30.07 KG/M2 | DIASTOLIC BLOOD PRESSURE: 80 MMHG | RESPIRATION RATE: 18 BRPM | HEART RATE: 87 BPM | WEIGHT: 186.3 LBS | TEMPERATURE: 98.3 F | SYSTOLIC BLOOD PRESSURE: 125 MMHG

## 2024-06-14 DIAGNOSIS — J02.9 SORE THROAT: ICD-10-CM

## 2024-06-14 DIAGNOSIS — J02.0 STREP THROAT: Primary | ICD-10-CM

## 2024-06-14 LAB — DEPRECATED S PYO AG THROAT QL EIA: POSITIVE

## 2024-06-14 PROCEDURE — 99213 OFFICE O/P EST LOW 20 MIN: CPT

## 2024-06-14 PROCEDURE — 87880 STREP A ASSAY W/OPTIC: CPT

## 2024-06-14 RX ORDER — AZITHROMYCIN 500 MG/1
500 TABLET, FILM COATED ORAL DAILY
Qty: 5 TABLET | Refills: 0 | Status: SHIPPED | OUTPATIENT
Start: 2024-06-14 | End: 2024-06-19

## 2024-06-14 NOTE — PROGRESS NOTES
Assessment & Plan     Sore throat  - Streptococcus A Rapid Scr w Reflx to PCR  - azithromycin (ZITHROMAX) 500 MG tablet; Take 1 tablet (500 mg) by mouth daily for 5 days  Strep throat  - Base on assessment and history of previous infections in the past. Currently positive for strep will treat this infection with azithromycin for a shorter course to prevent non adherence to treatment.  - azithromycin (ZITHROMAX) 500 MG tablet; Take 1 tablet (500 mg) by mouth daily for 5 days        Return in about 1 week (around 6/21/2024), or if symptoms worsen or fail to improve, for Follow up.    Mirna Shahid is a 33 year old, presenting for the following health issues:  Pharyngitis (Pt states that she has had cough, sore throat, painful swallowing and congestion since April but symptoms are intermittent SX came back Sunday more intense. )    CEASAR Shahid is a 33 year old female who present to the clinic with concern for sore throat and cough that started last Saturday and have not improve yet. Has been taking unfinished antibiotics tablets that were prescribed for her tonsillitis. Stated that the antibiotics was not effective in treating her symptoms so she decided to come into the clinic to be seen. Denies fever, abdominal pain, rashes, vomiting or diarrhea.      Review of Systems  Constitutional, HEENT, cardiovascular, pulmonary, gi and gu systems are negative, except as otherwise noted.      Objective    /80   Pulse 87   Temp 98.3  F (36.8  C) (Oral)   Resp 18   Wt 84.5 kg (186 lb 4.8 oz)   LMP  (LMP Unknown)   SpO2 98%   BMI 30.07 kg/m    Body mass index is 30.07 kg/m .    Physical Exam   GENERAL: alert and no distress  HENT: ear canals and TM's normal, nose and mouth without ulcers or lesions  NECK: bilateral anterior and posterior cervical adenopathy  RESP: lungs clear to auscultation - no rales, rhonchi or wheezes  CV: regular rate and rhythm, normal S1 S2, no S3 or S4, no murmur, click or rub,  no peripheral edema        Results for orders placed or performed in visit on 06/14/24   Streptococcus A Rapid Scr w Reflx to PCR     Status: Abnormal    Specimen: Throat; Swab   Result Value Ref Range    Group A Strep antigen Positive (A) Negative       Signed Electronically by: [facility]

## 2024-12-21 ENCOUNTER — HEALTH MAINTENANCE LETTER (OUTPATIENT)
Age: 34
End: 2024-12-21